# Patient Record
Sex: FEMALE | Race: WHITE | NOT HISPANIC OR LATINO | URBAN - METROPOLITAN AREA
[De-identification: names, ages, dates, MRNs, and addresses within clinical notes are randomized per-mention and may not be internally consistent; named-entity substitution may affect disease eponyms.]

---

## 2017-03-27 ENCOUNTER — OUTPATIENT (OUTPATIENT)
Dept: OUTPATIENT SERVICES | Facility: HOSPITAL | Age: 57
LOS: 1 days | End: 2017-03-27
Payer: COMMERCIAL

## 2017-03-27 ENCOUNTER — APPOINTMENT (OUTPATIENT)
Dept: ULTRASOUND IMAGING | Facility: CLINIC | Age: 57
End: 2017-03-27

## 2017-03-27 DIAGNOSIS — Z96.651 PRESENCE OF RIGHT ARTIFICIAL KNEE JOINT: Chronic | ICD-10-CM

## 2017-03-27 DIAGNOSIS — Z96.60 PRESENCE OF UNSPECIFIED ORTHOPEDIC JOINT IMPLANT: Chronic | ICD-10-CM

## 2017-03-27 DIAGNOSIS — Z98.89 OTHER SPECIFIED POSTPROCEDURAL STATES: Chronic | ICD-10-CM

## 2017-03-27 DIAGNOSIS — Z00.8 ENCOUNTER FOR OTHER GENERAL EXAMINATION: ICD-10-CM

## 2017-03-27 PROCEDURE — 76536 US EXAM OF HEAD AND NECK: CPT

## 2019-10-22 ENCOUNTER — OUTPATIENT (OUTPATIENT)
Dept: OUTPATIENT SERVICES | Facility: HOSPITAL | Age: 59
LOS: 1 days | End: 2019-10-22
Payer: COMMERCIAL

## 2019-10-22 VITALS
SYSTOLIC BLOOD PRESSURE: 152 MMHG | OXYGEN SATURATION: 99 % | HEART RATE: 72 BPM | HEIGHT: 62 IN | DIASTOLIC BLOOD PRESSURE: 75 MMHG | RESPIRATION RATE: 16 BRPM | WEIGHT: 156.97 LBS | TEMPERATURE: 99 F

## 2019-10-22 DIAGNOSIS — Z01.818 ENCOUNTER FOR OTHER PREPROCEDURAL EXAMINATION: ICD-10-CM

## 2019-10-22 DIAGNOSIS — Z96.60 PRESENCE OF UNSPECIFIED ORTHOPEDIC JOINT IMPLANT: Chronic | ICD-10-CM

## 2019-10-22 DIAGNOSIS — M16.11 UNILATERAL PRIMARY OSTEOARTHRITIS, RIGHT HIP: ICD-10-CM

## 2019-10-22 DIAGNOSIS — Z98.89 OTHER SPECIFIED POSTPROCEDURAL STATES: Chronic | ICD-10-CM

## 2019-10-22 DIAGNOSIS — Z96.651 PRESENCE OF RIGHT ARTIFICIAL KNEE JOINT: Chronic | ICD-10-CM

## 2019-10-22 LAB
ALBUMIN SERPL ELPH-MCNC: 3.6 G/DL — SIGNIFICANT CHANGE UP (ref 3.3–5)
ALP SERPL-CCNC: 116 U/L — SIGNIFICANT CHANGE UP (ref 40–120)
ALT FLD-CCNC: 32 U/L — SIGNIFICANT CHANGE UP (ref 12–78)
ANION GAP SERPL CALC-SCNC: 5 MMOL/L — SIGNIFICANT CHANGE UP (ref 5–17)
APPEARANCE UR: ABNORMAL
APTT BLD: 30.2 SEC — SIGNIFICANT CHANGE UP (ref 28.5–37)
AST SERPL-CCNC: 29 U/L — SIGNIFICANT CHANGE UP (ref 15–37)
BILIRUB SERPL-MCNC: 0.6 MG/DL — SIGNIFICANT CHANGE UP (ref 0.2–1.2)
BILIRUB UR-MCNC: NEGATIVE — SIGNIFICANT CHANGE UP
BUN SERPL-MCNC: 15 MG/DL — SIGNIFICANT CHANGE UP (ref 7–23)
CALCIUM SERPL-MCNC: 9.1 MG/DL — SIGNIFICANT CHANGE UP (ref 8.5–10.1)
CHLORIDE SERPL-SCNC: 106 MMOL/L — SIGNIFICANT CHANGE UP (ref 96–108)
CO2 SERPL-SCNC: 29 MMOL/L — SIGNIFICANT CHANGE UP (ref 22–31)
COLOR SPEC: YELLOW — SIGNIFICANT CHANGE UP
CREAT SERPL-MCNC: 0.51 MG/DL — SIGNIFICANT CHANGE UP (ref 0.5–1.3)
DIFF PNL FLD: ABNORMAL
GLUCOSE SERPL-MCNC: 90 MG/DL — SIGNIFICANT CHANGE UP (ref 70–99)
GLUCOSE UR QL: NEGATIVE — SIGNIFICANT CHANGE UP
HBA1C BLD-MCNC: 5.4 % — SIGNIFICANT CHANGE UP (ref 4–5.6)
HCT VFR BLD CALC: 36.1 % — SIGNIFICANT CHANGE UP (ref 34.5–45)
HGB BLD-MCNC: 11.6 G/DL — SIGNIFICANT CHANGE UP (ref 11.5–15.5)
INR BLD: 1 RATIO — SIGNIFICANT CHANGE UP (ref 0.88–1.16)
KETONES UR-MCNC: NEGATIVE — SIGNIFICANT CHANGE UP
LEUKOCYTE ESTERASE UR-ACNC: ABNORMAL
MCHC RBC-ENTMCNC: 27.8 PG — SIGNIFICANT CHANGE UP (ref 27–34)
MCHC RBC-ENTMCNC: 32.1 GM/DL — SIGNIFICANT CHANGE UP (ref 32–36)
MCV RBC AUTO: 86.4 FL — SIGNIFICANT CHANGE UP (ref 80–100)
NITRITE UR-MCNC: NEGATIVE — SIGNIFICANT CHANGE UP
NRBC # BLD: 0 /100 WBCS — SIGNIFICANT CHANGE UP (ref 0–0)
PH UR: 6.5 — SIGNIFICANT CHANGE UP (ref 5–8)
PLATELET # BLD AUTO: 307 K/UL — SIGNIFICANT CHANGE UP (ref 150–400)
POTASSIUM SERPL-MCNC: 5.1 MMOL/L — SIGNIFICANT CHANGE UP (ref 3.5–5.3)
POTASSIUM SERPL-SCNC: 5.1 MMOL/L — SIGNIFICANT CHANGE UP (ref 3.5–5.3)
PROT SERPL-MCNC: 9.5 G/DL — HIGH (ref 6–8.3)
PROT UR-MCNC: NEGATIVE — SIGNIFICANT CHANGE UP
PROTHROM AB SERPL-ACNC: 11.4 SEC — SIGNIFICANT CHANGE UP (ref 10–12.9)
RBC # BLD: 4.18 M/UL — SIGNIFICANT CHANGE UP (ref 3.8–5.2)
RBC # FLD: 14.5 % — SIGNIFICANT CHANGE UP (ref 10.3–14.5)
SODIUM SERPL-SCNC: 140 MMOL/L — SIGNIFICANT CHANGE UP (ref 135–145)
SP GR SPEC: 1 — LOW (ref 1.01–1.02)
UROBILINOGEN FLD QL: NEGATIVE — SIGNIFICANT CHANGE UP
WBC # BLD: 6.08 K/UL — SIGNIFICANT CHANGE UP (ref 3.8–10.5)
WBC # FLD AUTO: 6.08 K/UL — SIGNIFICANT CHANGE UP (ref 3.8–10.5)

## 2019-10-22 PROCEDURE — 71046 X-RAY EXAM CHEST 2 VIEWS: CPT | Mod: 26

## 2019-10-22 PROCEDURE — 93010 ELECTROCARDIOGRAM REPORT: CPT

## 2019-10-22 PROCEDURE — 73502 X-RAY EXAM HIP UNI 2-3 VIEWS: CPT | Mod: 26,RT

## 2019-10-22 NOTE — H&P PST ADULT - NEGATIVE ENMT SYMPTOMS
no sinus symptoms/no nasal congestion/no nasal discharge/no ear pain/no vertigo/no nasal obstruction

## 2019-10-22 NOTE — H&P PST ADULT - NSICDXPASTMEDICALHX_GEN_ALL_CORE_FT
PAST MEDICAL HISTORY:  Anemia     CIDP (chronic inflammatory demyelinating polyneuropathy)     Demyelinating neuropathy     H/O: rheumatic fever     Hyperlipidemia     Osteoarthritis, unspecified osteoarthritis type, unspecified site     Thyroid cyst     Unilateral primary osteoarthritis, right hip

## 2019-10-22 NOTE — H&P PST ADULT - HISTORY OF PRESENT ILLNESS
60 yo female scheduled for right hip replacement on 11/5/19 with Dr Beltran.  Patient states she had pain and decreased range of motion in eht right hip for a few years.  Pain is intermittent better when sitting.  Pain is 8/10 when standing and walking

## 2019-10-22 NOTE — H&P PST ADULT - NSICDXFAMILYHX_GEN_ALL_CORE_FT
FAMILY HISTORY:  Father  Still living? No  Diabetes mellitus, Age at diagnosis: Age Unknown    Mother  Still living? Yes, Estimated age: 81-90  CAD (coronary artery disease), Age at diagnosis: Age Unknown    Sibling  Still living? No  Family history of brain cancer, Age at diagnosis: 41-50

## 2019-10-22 NOTE — H&P PST ADULT - ASSESSMENT
60 yo female scheduled for right hip replacement on 11/5/19 with Dr Beltran.  Check labs cbc cmp pt ptt type and screen ua urine culture mrsa hgb a1c  ekg  xray chest and right hip pelvis  medical clearance  cardiology clearance  hibiclens for 3 days with written and verbal instructions given  patient is aware that if her nasal swab for mrsa is positive she will be prescribed mupirocin for 5 days of treatment  patient is aware that if her hgb a1c is 9 or greater she will need to see and endocrinologist before surgery  patient is declining to attend the joint replacement class  patient verbalizes understanding of instructions

## 2019-10-22 NOTE — H&P PST ADULT - NSICDXPASTSURGICALHX_GEN_ALL_CORE_FT
PAST SURGICAL HISTORY:  S/P carpal tunnel release left and right (1986)    S/P hip replacement left hip (5/15/14), Revision (6/5/14)    S/P knee surgery reconstruction of right patella (12/2010)    Status post total right knee replacement

## 2019-10-22 NOTE — H&P PST ADULT - MEDICATION HERBAL REMEDIES, PROFILE
Bedside and Verbal shift change report given to ARGENIS Landeros (oncoming nurse) by Saumya (offgoing nurse). Report included the following information SBAR, Kardex, Intake/Output, MAR, Recent Results and Cardiac Rhythm NSR /ST. no

## 2019-10-23 LAB
CULTURE RESULTS: SIGNIFICANT CHANGE UP
MRSA PCR RESULT.: SIGNIFICANT CHANGE UP
S AUREUS DNA NOSE QL NAA+PROBE: SIGNIFICANT CHANGE UP
SPECIMEN SOURCE: SIGNIFICANT CHANGE UP

## 2019-11-04 ENCOUNTER — TRANSCRIPTION ENCOUNTER (OUTPATIENT)
Age: 59
End: 2019-11-04

## 2019-11-05 ENCOUNTER — INPATIENT (INPATIENT)
Facility: HOSPITAL | Age: 59
LOS: 4 days | Discharge: HOME | DRG: 470 | End: 2019-11-10
Attending: ORTHOPAEDIC SURGERY | Admitting: ORTHOPAEDIC SURGERY
Payer: COMMERCIAL

## 2019-11-05 ENCOUNTER — TRANSCRIPTION ENCOUNTER (OUTPATIENT)
Age: 59
End: 2019-11-05

## 2019-11-05 ENCOUNTER — RESULT REVIEW (OUTPATIENT)
Age: 59
End: 2019-11-05

## 2019-11-05 VITALS
HEIGHT: 62 IN | DIASTOLIC BLOOD PRESSURE: 78 MMHG | WEIGHT: 158.95 LBS | TEMPERATURE: 99 F | OXYGEN SATURATION: 97 % | SYSTOLIC BLOOD PRESSURE: 146 MMHG | RESPIRATION RATE: 14 BRPM | HEART RATE: 73 BPM

## 2019-11-05 DIAGNOSIS — Z96.60 PRESENCE OF UNSPECIFIED ORTHOPEDIC JOINT IMPLANT: Chronic | ICD-10-CM

## 2019-11-05 DIAGNOSIS — M16.11 UNILATERAL PRIMARY OSTEOARTHRITIS, RIGHT HIP: ICD-10-CM

## 2019-11-05 DIAGNOSIS — Z96.651 PRESENCE OF RIGHT ARTIFICIAL KNEE JOINT: Chronic | ICD-10-CM

## 2019-11-05 DIAGNOSIS — Z98.89 OTHER SPECIFIED POSTPROCEDURAL STATES: Chronic | ICD-10-CM

## 2019-11-05 LAB
ANION GAP SERPL CALC-SCNC: 7 MMOL/L — SIGNIFICANT CHANGE UP (ref 5–17)
BUN SERPL-MCNC: 13 MG/DL — SIGNIFICANT CHANGE UP (ref 7–23)
CALCIUM SERPL-MCNC: 8.4 MG/DL — LOW (ref 8.5–10.1)
CHLORIDE SERPL-SCNC: 109 MMOL/L — HIGH (ref 96–108)
CO2 SERPL-SCNC: 26 MMOL/L — SIGNIFICANT CHANGE UP (ref 22–31)
CREAT SERPL-MCNC: 0.59 MG/DL — SIGNIFICANT CHANGE UP (ref 0.5–1.3)
GLUCOSE SERPL-MCNC: 134 MG/DL — HIGH (ref 70–99)
HCT VFR BLD CALC: 31.2 % — LOW (ref 34.5–45)
HGB BLD-MCNC: 10.1 G/DL — LOW (ref 11.5–15.5)
MCHC RBC-ENTMCNC: 28.3 PG — SIGNIFICANT CHANGE UP (ref 27–34)
MCHC RBC-ENTMCNC: 32.4 GM/DL — SIGNIFICANT CHANGE UP (ref 32–36)
MCV RBC AUTO: 87.4 FL — SIGNIFICANT CHANGE UP (ref 80–100)
NRBC # BLD: 0 /100 WBCS — SIGNIFICANT CHANGE UP (ref 0–0)
PLATELET # BLD AUTO: 374 K/UL — SIGNIFICANT CHANGE UP (ref 150–400)
POTASSIUM SERPL-MCNC: 4.2 MMOL/L — SIGNIFICANT CHANGE UP (ref 3.5–5.3)
POTASSIUM SERPL-SCNC: 4.2 MMOL/L — SIGNIFICANT CHANGE UP (ref 3.5–5.3)
RBC # BLD: 3.57 M/UL — LOW (ref 3.8–5.2)
RBC # FLD: 14.3 % — SIGNIFICANT CHANGE UP (ref 10.3–14.5)
SODIUM SERPL-SCNC: 142 MMOL/L — SIGNIFICANT CHANGE UP (ref 135–145)
WBC # BLD: 16.18 K/UL — HIGH (ref 3.8–10.5)
WBC # FLD AUTO: 16.18 K/UL — HIGH (ref 3.8–10.5)

## 2019-11-05 PROCEDURE — 88305 TISSUE EXAM BY PATHOLOGIST: CPT | Mod: 26

## 2019-11-05 PROCEDURE — 88311 DECALCIFY TISSUE: CPT | Mod: 26

## 2019-11-05 RX ORDER — ACETAMINOPHEN 500 MG
650 TABLET ORAL EVERY 6 HOURS
Refills: 0 | Status: DISCONTINUED | OUTPATIENT
Start: 2019-11-05 | End: 2019-11-06

## 2019-11-05 RX ORDER — SIMVASTATIN 20 MG/1
20 TABLET, FILM COATED ORAL AT BEDTIME
Refills: 0 | Status: DISCONTINUED | OUTPATIENT
Start: 2019-11-05 | End: 2019-11-10

## 2019-11-05 RX ORDER — OXYCODONE HYDROCHLORIDE 5 MG/1
10 TABLET ORAL ONCE
Refills: 0 | Status: DISCONTINUED | OUTPATIENT
Start: 2019-11-05 | End: 2019-11-05

## 2019-11-05 RX ORDER — OXYCODONE HYDROCHLORIDE 5 MG/1
10 TABLET ORAL EVERY 4 HOURS
Refills: 0 | Status: DISCONTINUED | OUTPATIENT
Start: 2019-11-05 | End: 2019-11-05

## 2019-11-05 RX ORDER — HYDROMORPHONE HYDROCHLORIDE 2 MG/ML
0.5 INJECTION INTRAMUSCULAR; INTRAVENOUS; SUBCUTANEOUS EVERY 4 HOURS
Refills: 0 | Status: DISCONTINUED | OUTPATIENT
Start: 2019-11-05 | End: 2019-11-06

## 2019-11-05 RX ORDER — OXYCODONE HYDROCHLORIDE 5 MG/1
5 TABLET ORAL EVERY 4 HOURS
Refills: 0 | Status: DISCONTINUED | OUTPATIENT
Start: 2019-11-05 | End: 2019-11-05

## 2019-11-05 RX ORDER — POLYETHYLENE GLYCOL 3350 17 G/17G
17 POWDER, FOR SOLUTION ORAL DAILY
Refills: 0 | Status: DISCONTINUED | OUTPATIENT
Start: 2019-11-05 | End: 2019-11-10

## 2019-11-05 RX ORDER — PANTOPRAZOLE SODIUM 20 MG/1
40 TABLET, DELAYED RELEASE ORAL
Refills: 0 | Status: DISCONTINUED | OUTPATIENT
Start: 2019-11-05 | End: 2019-11-10

## 2019-11-05 RX ORDER — CEFAZOLIN SODIUM 1 G
2000 VIAL (EA) INJECTION EVERY 8 HOURS
Refills: 0 | Status: COMPLETED | OUTPATIENT
Start: 2019-11-05 | End: 2019-11-06

## 2019-11-05 RX ORDER — METOPROLOL TARTRATE 50 MG
25 TABLET ORAL DAILY
Refills: 0 | Status: DISCONTINUED | OUTPATIENT
Start: 2019-11-05 | End: 2019-11-06

## 2019-11-05 RX ORDER — METOPROLOL TARTRATE 50 MG
1 TABLET ORAL
Qty: 0 | Refills: 0 | DISCHARGE

## 2019-11-05 RX ORDER — METOCLOPRAMIDE HCL 10 MG
5 TABLET ORAL ONCE
Refills: 0 | Status: DISCONTINUED | OUTPATIENT
Start: 2019-11-05 | End: 2019-11-05

## 2019-11-05 RX ORDER — ONDANSETRON 8 MG/1
4 TABLET, FILM COATED ORAL EVERY 6 HOURS
Refills: 0 | Status: DISCONTINUED | OUTPATIENT
Start: 2019-11-05 | End: 2019-11-08

## 2019-11-05 RX ORDER — ASPIRIN/CALCIUM CARB/MAGNESIUM 324 MG
325 TABLET ORAL
Refills: 0 | Status: DISCONTINUED | OUTPATIENT
Start: 2019-11-05 | End: 2019-11-10

## 2019-11-05 RX ORDER — SENNA PLUS 8.6 MG/1
2 TABLET ORAL AT BEDTIME
Refills: 0 | Status: DISCONTINUED | OUTPATIENT
Start: 2019-11-05 | End: 2019-11-10

## 2019-11-05 RX ORDER — BUPIVACAINE 13.3 MG/ML
20 INJECTION, SUSPENSION, LIPOSOMAL INFILTRATION ONCE
Refills: 0 | Status: DISCONTINUED | OUTPATIENT
Start: 2019-11-05 | End: 2019-11-05

## 2019-11-05 RX ORDER — CEFAZOLIN SODIUM 1 G
2000 VIAL (EA) INJECTION ONCE
Refills: 0 | Status: COMPLETED | OUTPATIENT
Start: 2019-11-05 | End: 2019-11-05

## 2019-11-05 RX ORDER — SODIUM CHLORIDE 9 MG/ML
1000 INJECTION, SOLUTION INTRAVENOUS
Refills: 0 | Status: DISCONTINUED | OUTPATIENT
Start: 2019-11-05 | End: 2019-11-05

## 2019-11-05 RX ORDER — OXYCODONE HYDROCHLORIDE 5 MG/1
5 TABLET ORAL ONCE
Refills: 0 | Status: DISCONTINUED | OUTPATIENT
Start: 2019-11-05 | End: 2019-11-05

## 2019-11-05 RX ORDER — TRAMADOL HYDROCHLORIDE 50 MG/1
50 TABLET ORAL EVERY 6 HOURS
Refills: 0 | Status: DISCONTINUED | OUTPATIENT
Start: 2019-11-05 | End: 2019-11-10

## 2019-11-05 RX ORDER — TRAMADOL HYDROCHLORIDE 50 MG/1
25 TABLET ORAL EVERY 6 HOURS
Refills: 0 | Status: DISCONTINUED | OUTPATIENT
Start: 2019-11-05 | End: 2019-11-10

## 2019-11-05 RX ORDER — FERROUS SULFATE 325(65) MG
325 TABLET ORAL DAILY
Refills: 0 | Status: DISCONTINUED | OUTPATIENT
Start: 2019-11-05 | End: 2019-11-10

## 2019-11-05 RX ORDER — HYDROMORPHONE HYDROCHLORIDE 2 MG/ML
0.5 INJECTION INTRAMUSCULAR; INTRAVENOUS; SUBCUTANEOUS
Refills: 0 | Status: DISCONTINUED | OUTPATIENT
Start: 2019-11-05 | End: 2019-11-05

## 2019-11-05 RX ADMIN — Medication 100 MILLIGRAM(S): at 17:17

## 2019-11-05 RX ADMIN — SODIUM CHLORIDE 75 MILLILITER(S): 9 INJECTION, SOLUTION INTRAVENOUS at 07:06

## 2019-11-05 RX ADMIN — SODIUM CHLORIDE 100 MILLILITER(S): 9 INJECTION, SOLUTION INTRAVENOUS at 11:26

## 2019-11-05 RX ADMIN — SIMVASTATIN 20 MILLIGRAM(S): 20 TABLET, FILM COATED ORAL at 21:29

## 2019-11-05 RX ADMIN — Medication 325 MILLIGRAM(S): at 17:17

## 2019-11-05 NOTE — DISCHARGE NOTE PROVIDER - CARE PROVIDER_API CALL
Sajan eBltran)  Orthopaedic Surgery  [address] [place] [address]  Phone: [phone]  Fax: [phone]  Follow Up Time: Sajan Beltran)  Orthopaedic Surgery  [address] [place] [address]  Phone: [phone]  Fax: [phone]  Follow Up Time: 2 weeks

## 2019-11-05 NOTE — DISCHARGE NOTE PROVIDER - NSDCCPTREATMENT_GEN_ALL_CORE_FT
PRINCIPAL PROCEDURE  Procedure: Total replacement of hip joint by anterior approach  Findings and Treatment: right

## 2019-11-05 NOTE — DISCHARGE NOTE PROVIDER - NSDCCPCAREPLAN_GEN_ALL_CORE_FT
PRINCIPAL DISCHARGE DIAGNOSIS  Diagnosis: Osteoarthritis of right hip  Assessment and Plan of Treatment: PRINCIPAL DISCHARGE DIAGNOSIS  Diagnosis: Osteoarthritis of right hip  Assessment and Plan of Treatment: You underwent total hip repalacement.  Please take your aspirin 325 mg twice a day. Do not skip doses. This is for DVT prophylaxis  Take Tramadol 50 mg only as neededfor pain.  Please follow up with Dr. Beltran in one to two weeks following discharge        SECONDARY DISCHARGE DIAGNOSES  Diagnosis: Allergy to adhesive  Assessment and Plan of Treatment: You were noted to have an allergy to adhesives.   You were given hydrocortisone cream. Please apply twice a day to the affected area for one week.  If this does not resolve you should follow up with your primary care physician

## 2019-11-05 NOTE — DISCHARGE NOTE PROVIDER - NSDCACTIVITY_GEN_ALL_CORE
Walking - Outdoors allowed/Stairs allowed/Walking - Indoors allowed Walking - Outdoors allowed/Stairs allowed/Walking - Indoors allowed/No restrictions

## 2019-11-05 NOTE — DISCHARGE NOTE PROVIDER - HOSPITAL COURSE
The patient is a 59 year old female status post elective Anterior Total Hip Arthroplasty to the right hip after failing outpatient non-operative conservative management.  Patient presented to [facility] after being medically cleared for an elective surgical procedure. The patient was taken to the operating room on date mentioned above. Prophylactic antibiotics were started before the procedure and continued for 24 hours.  There were no complications during the procedure and patient tolerated the procedure well.  The patient was transferred to recovery room in stable condition and subsequently to surgical floor.  Patient was placed ASA 325mg for anticoagulation.  All home medications were continued.  The patient received physical therapy daily and daily labs were followed.  The dressing was kept clean, dry, intact.  The rest of the hospital stay was unremarkable. The patient is a 59 year old female status post elective Anterior Total Hip Arthroplasty to the right hip after failing outpatient non-operative conservative management.  Patient presented to [facility] after being medically cleared for an elective surgical procedure. The patient was taken to the operating room on date mentioned above. Prophylactic antibiotics were started before the procedure and continued for 24 hours.  There were no complications during the procedure and patient tolerated the procedure well.  The patient was transferred to recovery room in stable condition and subsequently to surgical floor.  Patient was placed ASA 325mg for anticoagulation.  All home medications were continued.  The patient received physical therapy daily and daily labs were followed.  The dressing was kept clean, dry, intact.          post op fever - likely UTI    post op anemia - given PRBC transfusion The patient is a 59 year old female status post elective Anterior Total Hip Arthroplasty to the right hip after failing outpatient non-operative conservative management.  Patient presented to [facility] after being medically cleared for an elective surgical procedure. The patient was taken to the operating room on date mentioned above. Prophylactic antibiotics were started before the procedure and continued for 24 hours.  There were no complications during the procedure and patient tolerated the procedure well.  The patient was transferred to recovery room in stable condition and subsequently to surgical floor.  Patient was placed ASA 325mg for anticoagulation.  All home medications were continued.  The patient received physical therapy daily and daily labs were followed.  The patient developed a post op fever and empiric antibiotics were started for presumed cellulitis. Fevers resolved and a reaction to the adhesive dressing over the bandage site was noted. The patient also required a transfusion of one unit of packed red blood cells for postoperative anemia. Antibiotics were stopped and the patient was deemed medically stable on the day of discharge.

## 2019-11-05 NOTE — DISCHARGE NOTE PROVIDER - NSDCFUADDINST_GEN_ALL_CORE_FT
Discharge Instructions Total Hip Arthroplasty    1. Diet: Resume previous diet  2. Activity: WBAT. Rolling walker. Daily Physical Therapy. Anterior hip precautions.  3. Call with: fever over 101, wound redness, drainage or open area, calf pain/calf swelling.  4. Wound Care: Remove old and Place new Aquacel bandage to hip wound every 7days.   5. RN can Remove Sutures Post Op Day #14 () so long as wound is healed, no drainage or open area.   6. OK to Shower with Aquacel. Must be an Aquacel. Avoid direct water beating on bandage.   7. DVT PE Prophylaxis: Lovenox 40mg. See Med Rec.  8.  Follow Up: Dr. Beltran 2 weeks in office. Call to schedule.   9. Pain Medication: eRX sent to your pharmacy for  if you go home.

## 2019-11-05 NOTE — DISCHARGE NOTE PROVIDER - NSDCMRMEDTOKEN_GEN_ALL_CORE_FT
aspirin 81 mg oral tablet: 1 tab(s) orally once a day  ferrous sulfate 325 mg oral delayed release tablet: 1 tab(s) orally once a day  Gamunex:   once a month  last 10/16 /19  metoprolol succinate 25 mg oral capsule, extended release: 1 cap(s) orally once a day  Multiple Vitamins oral tablet: 1 tab(s) orally once a day  simvastatin 20 mg oral tablet: 1 tab(s) orally once a day (at bedtime) acetaminophen 325 mg oral tablet: 2 tab(s) orally every 4 hours, As needed, Temp greater or equal to 38C (100.4F)  aluminum hydroxide-magnesium hydroxide 200 mg-200 mg/5 mL oral suspension: 30 milliliter(s) orally 4 times a day, As needed, Indigestion  aspirin 325 mg oral delayed release tablet: 1 tab(s) orally 2 times a day  ferrous sulfate 325 mg oral delayed release tablet: 1 tab(s) orally once a day  Gamunex:   once a month  last 10/16 /19  metoprolol succinate 25 mg oral capsule, extended release: 1 cap(s) orally once a day  Multiple Vitamins oral tablet: 1 tab(s) orally once a day  senna oral tablet: 2 tab(s) orally once a day (at bedtime), As needed, Constipation  simvastatin 20 mg oral tablet: 1 tab(s) orally once a day (at bedtime) acetaminophen 325 mg oral tablet: 2 tab(s) orally every 4 hours, As needed, Temp greater or equal to 38C (100.4F)  aluminum hydroxide-magnesium hydroxide 200 mg-200 mg/5 mL oral suspension: 30 milliliter(s) orally 4 times a day, As needed, Indigestion  aspirin 325 mg oral delayed release tablet: 1 tab(s) orally 2 times a day  clotrimazole-betamethasone dipropionate 1%-0.05% topical cream: Apply topically to affected area. twice a day for 7 days.  ferrous sulfate 325 mg oral delayed release tablet: 1 tab(s) orally once a day  Gamunex:   once a month  last 10/16 /19  metoprolol succinate 25 mg oral capsule, extended release: 1 cap(s) orally once a day  Multiple Vitamins oral tablet: 1 tab(s) orally once a day  senna oral tablet: 2 tab(s) orally once a day (at bedtime), As needed, Constipation  simvastatin 20 mg oral tablet: 1 tab(s) orally once a day (at bedtime)  traMADol 50 mg oral tablet: 1 tab(s) orally every 6 hours, As Needed -for severe pain MDD:4

## 2019-11-05 NOTE — CHART NOTE - NSCHARTNOTEFT_GEN_A_CORE
Postop Check    Patient tolerated the procedure well. Patient seen and examined at bedside.  No acute complaints at this time. Pain well controlled. Denies chest pain, shortness of breath, nausea or vomiting.     PE:  Vital Signs Last 24 Hrs  T(C): 36.6 (11-05-19 @ 13:10), Max: 37.1 (11-05-19 @ 07:06)  T(F): 97.8 (11-05-19 @ 13:10), Max: 98.8 (11-05-19 @ 07:06)  HR: 77 (11-05-19 @ 13:10) (70 - 102)  BP: 138/71 (11-05-19 @ 13:10) (137/67 - 148/75)  BP(mean): --  RR: 18 (11-05-19 @ 13:10) (10 - 18)  SpO2: 97% (11-05-19 @ 13:10) (96% - 100%)    General: NAD, resting comfortably in bed  RLE:   Dressing with one mild area of spotting 1x1 cm on the proximal part of the dressing, dry and intact throughout.  SCDs present bilaterally  Compartments soft and compressible  No calf tenderness bilaterally  +TA/EHL/FHL/GSC  SILT L3-S1  2+ DP/PT                          10.1   16.18 )-----------( 374      ( 05 Nov 2019 11:48 )             31.2     05 Nov 2019 11:48    142    |  109    |  13     ----------------------------<  134    4.2     |  26     |  0.59     Ca    8.4        05 Nov 2019 11:48          A/P:  59y f s/p R YENI POD 0  -PT/OT -WBAT on RLE  -Pain Control  -DVT ppx w/aspirin  -Continue perioperative abx x 24 hours  -FU AM Labs  -Rest, ice, compress and elevate the extremity as we needed  -Incentive Spirometry  -Medical management appreciated    Ortho p. [phone]

## 2019-11-06 ENCOUNTER — TRANSCRIPTION ENCOUNTER (OUTPATIENT)
Age: 59
End: 2019-11-06

## 2019-11-06 DIAGNOSIS — E78.5 HYPERLIPIDEMIA, UNSPECIFIED: ICD-10-CM

## 2019-11-06 DIAGNOSIS — I95.9 HYPOTENSION, UNSPECIFIED: ICD-10-CM

## 2019-11-06 DIAGNOSIS — Z29.9 ENCOUNTER FOR PROPHYLACTIC MEASURES, UNSPECIFIED: ICD-10-CM

## 2019-11-06 DIAGNOSIS — M16.11 UNILATERAL PRIMARY OSTEOARTHRITIS, RIGHT HIP: ICD-10-CM

## 2019-11-06 LAB
ANION GAP SERPL CALC-SCNC: 7 MMOL/L — SIGNIFICANT CHANGE UP (ref 5–17)
BUN SERPL-MCNC: 17 MG/DL — SIGNIFICANT CHANGE UP (ref 7–23)
CALCIUM SERPL-MCNC: 8.4 MG/DL — LOW (ref 8.5–10.1)
CHLORIDE SERPL-SCNC: 105 MMOL/L — SIGNIFICANT CHANGE UP (ref 96–108)
CO2 SERPL-SCNC: 26 MMOL/L — SIGNIFICANT CHANGE UP (ref 22–31)
CREAT SERPL-MCNC: 0.65 MG/DL — SIGNIFICANT CHANGE UP (ref 0.5–1.3)
GLUCOSE SERPL-MCNC: 108 MG/DL — HIGH (ref 70–99)
HCT VFR BLD CALC: 28 % — LOW (ref 34.5–45)
HCT VFR BLD CALC: 28.5 % — LOW (ref 34.5–45)
HGB BLD-MCNC: 8.9 G/DL — LOW (ref 11.5–15.5)
HGB BLD-MCNC: 9 G/DL — LOW (ref 11.5–15.5)
MCHC RBC-ENTMCNC: 27.6 PG — SIGNIFICANT CHANGE UP (ref 27–34)
MCHC RBC-ENTMCNC: 31.6 GM/DL — LOW (ref 32–36)
MCV RBC AUTO: 87.4 FL — SIGNIFICANT CHANGE UP (ref 80–100)
NRBC # BLD: 0 /100 WBCS — SIGNIFICANT CHANGE UP (ref 0–0)
PLATELET # BLD AUTO: 411 K/UL — HIGH (ref 150–400)
POTASSIUM SERPL-MCNC: 4.2 MMOL/L — SIGNIFICANT CHANGE UP (ref 3.5–5.3)
POTASSIUM SERPL-SCNC: 4.2 MMOL/L — SIGNIFICANT CHANGE UP (ref 3.5–5.3)
RBC # BLD: 3.26 M/UL — LOW (ref 3.8–5.2)
RBC # FLD: 14.5 % — SIGNIFICANT CHANGE UP (ref 10.3–14.5)
SODIUM SERPL-SCNC: 138 MMOL/L — SIGNIFICANT CHANGE UP (ref 135–145)
WBC # BLD: 9.08 K/UL — SIGNIFICANT CHANGE UP (ref 3.8–10.5)
WBC # FLD AUTO: 9.08 K/UL — SIGNIFICANT CHANGE UP (ref 3.8–10.5)

## 2019-11-06 RX ORDER — SODIUM CHLORIDE 9 MG/ML
1000 INJECTION INTRAMUSCULAR; INTRAVENOUS; SUBCUTANEOUS ONCE
Refills: 0 | Status: COMPLETED | OUTPATIENT
Start: 2019-11-06 | End: 2019-11-06

## 2019-11-06 RX ORDER — SODIUM CHLORIDE 9 MG/ML
500 INJECTION INTRAMUSCULAR; INTRAVENOUS; SUBCUTANEOUS ONCE
Refills: 0 | Status: COMPLETED | OUTPATIENT
Start: 2019-11-06 | End: 2019-11-06

## 2019-11-06 RX ORDER — SODIUM CHLORIDE 9 MG/ML
1000 INJECTION INTRAMUSCULAR; INTRAVENOUS; SUBCUTANEOUS
Refills: 0 | Status: DISCONTINUED | OUTPATIENT
Start: 2019-11-06 | End: 2019-11-08

## 2019-11-06 RX ORDER — ACETAMINOPHEN 500 MG
650 TABLET ORAL EVERY 4 HOURS
Refills: 0 | Status: DISCONTINUED | OUTPATIENT
Start: 2019-11-06 | End: 2019-11-10

## 2019-11-06 RX ORDER — METOPROLOL TARTRATE 50 MG
25 TABLET ORAL DAILY
Refills: 0 | Status: DISCONTINUED | OUTPATIENT
Start: 2019-11-06 | End: 2019-11-06

## 2019-11-06 RX ADMIN — Medication 325 MILLIGRAM(S): at 05:31

## 2019-11-06 RX ADMIN — Medication 25 MILLIGRAM(S): at 05:31

## 2019-11-06 RX ADMIN — Medication 1 TABLET(S): at 12:45

## 2019-11-06 RX ADMIN — TRAMADOL HYDROCHLORIDE 50 MILLIGRAM(S): 50 TABLET ORAL at 00:07

## 2019-11-06 RX ADMIN — Medication 650 MILLIGRAM(S): at 16:14

## 2019-11-06 RX ADMIN — Medication 650 MILLIGRAM(S): at 21:15

## 2019-11-06 RX ADMIN — PANTOPRAZOLE SODIUM 40 MILLIGRAM(S): 20 TABLET, DELAYED RELEASE ORAL at 05:31

## 2019-11-06 RX ADMIN — SODIUM CHLORIDE 75 MILLILITER(S): 9 INJECTION INTRAMUSCULAR; INTRAVENOUS; SUBCUTANEOUS at 21:15

## 2019-11-06 RX ADMIN — TRAMADOL HYDROCHLORIDE 50 MILLIGRAM(S): 50 TABLET ORAL at 00:20

## 2019-11-06 RX ADMIN — POLYETHYLENE GLYCOL 3350 17 GRAM(S): 17 POWDER, FOR SOLUTION ORAL at 12:45

## 2019-11-06 RX ADMIN — Medication 650 MILLIGRAM(S): at 22:15

## 2019-11-06 RX ADMIN — Medication 325 MILLIGRAM(S): at 17:35

## 2019-11-06 RX ADMIN — Medication 650 MILLIGRAM(S): at 16:10

## 2019-11-06 RX ADMIN — SODIUM CHLORIDE 500 MILLILITER(S): 9 INJECTION INTRAMUSCULAR; INTRAVENOUS; SUBCUTANEOUS at 08:57

## 2019-11-06 RX ADMIN — Medication 325 MILLIGRAM(S): at 12:45

## 2019-11-06 RX ADMIN — SODIUM CHLORIDE 1000 MILLILITER(S): 9 INJECTION INTRAMUSCULAR; INTRAVENOUS; SUBCUTANEOUS at 11:15

## 2019-11-06 RX ADMIN — SIMVASTATIN 20 MILLIGRAM(S): 20 TABLET, FILM COATED ORAL at 21:16

## 2019-11-06 RX ADMIN — Medication 100 MILLIGRAM(S): at 00:02

## 2019-11-06 NOTE — OCCUPATIONAL THERAPY INITIAL EVALUATION ADULT - PERTINENT HX OF CURRENT PROBLEM, REHAB EVAL
58 y/o female s/p Right THR anterior approach on 11/5/19 by Dr. Beltran. Post-op hypotension; patient received +fluid bolus 11/6/19.

## 2019-11-06 NOTE — CHART NOTE - NSCHARTNOTEFT_GEN_A_CORE
Called by RN for persistent fevers to 102.6F (rectal), as well as concern regarding redness around incision site.  Patient given 650mg tylenol at 21:00 with repeat rectal temperature of 101.5F at 22:12.  Last dose of post-op ancef given at midnight today. Patient seen and examined at bedside immediately.  Patient reports some pain of right thigh, feels "feverish."    Vital Signs Last 24 Hrs  T(C): 38.6 (06 Nov 2019 22:12), Max: 39.2 (06 Nov 2019 16:05)  T(F): 101.5 (06 Nov 2019 22:12), Max: 102.6 (06 Nov 2019 16:05)  HR: 96 (06 Nov 2019 19:35) (86 - 107)  BP: 95/52 (06 Nov 2019 19:37) (82/52 - 114/65)  RR: 18 (06 Nov 2019 19:35) (16 - 18)  SpO2: 95% (06 Nov 2019 19:35) (95% - 98%)    PHYSICAL EXAM  GENERAL:  Well-nourished, well-developed female lying comfortably in bed in NAD  EXTREMITIES:  Right thigh +edematous compared with left.  Aquacel dressing in place with slight bloody drainage noted inside, no gross leakage.  Skin surrounding incision warm to the touch, mildly erythematous.  NEURO:  A&O x 3    A&P  59 year old female POD#1 s/p right total hip replacement, now with post-operative fevers to 102.6F, likely post-operative inflammatory process.  Suspicion for infection low POD#1.    - No need for further antibiotics at this time  - Manage fever with tylenol 650mg q4h PRN  - Will continue to monitor  - To be followed up by ortho team in the AM  - Discussed with Dr. Beltran

## 2019-11-06 NOTE — PROGRESS NOTE ADULT - SUBJECTIVE AND OBJECTIVE BOX
DEPARTMENT OF ANESTHESIA  POST ANESTHETIC EVALUATION    The Patient was interviewed and evaluated    Vital Signs Last 24 Hrs  T(C): 37.4 (06 Nov 2019 07:16), Max: 37.9 (06 Nov 2019 00:59)  T(F): 99.3 (06 Nov 2019 07:16), Max: 100.2 (06 Nov 2019 00:59)  HR: 93 (06 Nov 2019 07:16) (70 - 102)  BP: 86/54 (06 Nov 2019 07:16) (86/54 - 148/75)  BP(mean): --  RR: 17 (06 Nov 2019 07:16) (10 - 18)  SpO2: 96% (06 Nov 2019 07:16) (95% - 100%)    Evaluation:      (x ) No apparent complications or complaints regarding anesthesia care at this time  (x ) All questions were answered    Condition:  (x ) Stable      ( ) Guarded      ( ) Critical    Recommendations:  (x ) None     ( ) Other:

## 2019-11-06 NOTE — OCCUPATIONAL THERAPY INITIAL EVALUATION ADULT - ADDITIONAL COMMENTS
Pt reports that she will be staying with her mother and sister in a house with 8 steps with handrail to enter. 6 steps with handrail to access main floor of house with +stall shower. Pt owns a rolling walker, cane, raised toilet seat with arms, shower chair with back, dressing stick and LH shoe horn. Pt wears progressive lenses and bilateral hearing aides.

## 2019-11-06 NOTE — DISCHARGE NOTE NURSING/CASE MANAGEMENT/SOCIAL WORK - NSDCPNINST_GEN_ALL_CORE
Any fever; worsening pain or swelling of the leg and incision call your doctor or go to emergency room

## 2019-11-06 NOTE — CHART NOTE - NSCHARTNOTEFT_GEN_A_CORE
Called by RN for Pt with BP 95/52    Pt seen and examined at bedside, pt is asymptomatic at this time. Denies dizziness, lightheadedness, CP, SOB, N, palpitations.        T(C): 37.5 (11-06-19 @ 19:35), Max: 39.2 (11-06-19 @ 16:05)  HR: 96 (11-06-19 @ 19:35) (84 - 107)  BP: 95/52 (11-06-19 @ 19:37) (82/52 - 117/72)  RR: 18 (11-06-19 @ 19:35) (16 - 18)  SpO2: 95% (11-06-19 @ 19:35) (95% - 98%)  Wt(kg): --    Physical :  Gen- NAD, ncat  Cardio - s+1,s+2, rrr, no murmur  Lung - cta b/l, no wheeze, no rhonchi, no rales   Abdomen- +BS, NT/ND, no guarding, no rebound, no masses  Ext- no edema, 2+ pulses b/l  Neuro- CN grossly intact, strength 5/5 b/l extrem    LABS:                        8.9    x     )-----------( x        ( 06 Nov 2019 09:05 )             28.0     11-06    138  |  105  |  17  ----------------------------<  108<H>  4.2   |  26  |  0.65    Ca    8.4<L>      06 Nov 2019 06:44                  Assessment/Plan  59yFemale s/p R YENI POD 2 now with low BP  - Per chart review pts BP has been running low  - pt was given IVF earlier today  - spoke to Ortho resident and will monitor pt BP for now as she is asymptomatic  - RN to call if any changes      Dr Dave  PGY3  [phone]

## 2019-11-06 NOTE — PROGRESS NOTE ADULT - ASSESSMENT
A/P:  59y f s/p R YENI POD 2  -PT/OT -WBAT on RLE  -Pain Control  -DVT ppx w/aspirin  -Continue perioperative abx x 24 hours  -FU AM Labs  -Rest, ice, compress and elevate the extremity as we needed  -Incentive Spirometry  -Medical management appreciated    Ortho p. [phone].

## 2019-11-06 NOTE — PROGRESS NOTE ADULT - SUBJECTIVE AND OBJECTIVE BOX
Patient is a 59y old  Female who presents with a chief complaint of hip replacement (05 Nov 2019 17:02)      INTERVAL /OVERNIGHT EVENTS: felt lightheaded    MEDICATIONS  (STANDING):  aspirin enteric coated 325 milliGRAM(s) Oral two times a day  ferrous    sulfate 325 milliGRAM(s) Oral daily  metoprolol succinate ER 25 milliGRAM(s) Oral daily  multivitamin 1 Tablet(s) Oral daily  pantoprazole    Tablet 40 milliGRAM(s) Oral before breakfast  polyethylene glycol 3350 17 Gram(s) Oral daily  simvastatin 20 milliGRAM(s) Oral at bedtime    MEDICATIONS  (PRN):  acetaminophen   Tablet .. 650 milliGRAM(s) Oral every 6 hours PRN Temp greater or equal to 38C (100.4F)  aluminum hydroxide/magnesium hydroxide/simethicone Suspension 30 milliLiter(s) Oral four times a day PRN Indigestion  HYDROmorphone  Injectable 0.5 milliGRAM(s) IV Push every 4 hours PRN Severe Pain (7 - 10)  ondansetron Injectable 4 milliGRAM(s) IV Push every 6 hours PRN Nausea and/or Vomiting  senna 2 Tablet(s) Oral at bedtime PRN Constipation  traMADol 25 milliGRAM(s) Oral every 6 hours PRN Moderate Pain (4 - 6)  traMADol 50 milliGRAM(s) Oral every 6 hours PRN Severe Pain (7 - 10)      Allergies    adhesives (Rash)  No Known Drug Allergies    Intolerances        REVIEW OF SYSTEMS:  CONSTITUTIONAL: No fever, weight loss, or fatigue  EYES: No eye pain, visual disturbances, or discharge  ENMT:  No difficulty hearing, tinnitus, vertigo; No sinus or throat pain  NECK: No pain or stiffness  RESPIRATORY: No cough, wheezing, chills or hemoptysis; No shortness of breath  CARDIOVASCULAR: No chest pain, palpitations, dizziness, or leg swelling  GASTROINTESTINAL: No abdominal or epigastric pain. No nausea, vomiting, or hematemesis; No diarrhea or constipation. No melena or hematochezia.  GENITOURINARY: No dysuria, frequency, hematuria, or incontinence  NEUROLOGICAL: No headaches, memory loss, loss of strength, numbness, or tremors  SKIN: No itching, burning, rashes, or lesions   LYMPH NODES: No enlarged glands  ENDOCRINE: No heat or cold intolerance; No hair loss; No polydipsia or polyuria  MUSCULOSKELETAL: No joint pain or swelling; No muscle, back, or extremity pain  PSYCHIATRIC: No depression, anxiety, mood swings, or difficulty sleeping  HEME/LYMPH: No easy bruising, or bleeding gums  ALLERGY AND IMMUNOLOGIC: No hives or eczema    Vital Signs Last 24 Hrs  T(C): 37.4 (06 Nov 2019 07:16), Max: 37.9 (06 Nov 2019 00:59)  T(F): 99.3 (06 Nov 2019 07:16), Max: 100.2 (06 Nov 2019 00:59)  HR: 86 (06 Nov 2019 12:20) (75 - 97)  BP: 105/63 (06 Nov 2019 12:20) (82/52 - 121/68)  BP(mean): --  RR: 17 (06 Nov 2019 07:16) (16 - 18)  SpO2: 98% (06 Nov 2019 12:20) (95% - 98%)    PHYSICAL EXAM:  GENERAL: NAD, well-groomed, well-developed  HEAD:  Atraumatic, Normocephalic  EYES: EOMI, PERRLA, conjunctiva and sclera clear  ENMT: No tonsillar erythema, exudates, or enlargement; Moist mucous membranes, Good dentition, No lesions  NECK: Supple, No JVD, Normal thyroid  NERVOUS SYSTEM:  Alert & Oriented X3, Good concentration; Motor Strength 5/5 B/L upper and lower extremities; DTRs 2+ intact and symmetric  CHEST/LUNG: Clear to auscultation bilaterally; No rales, rhonchi, wheezing, or rubs  HEART: Regular rate and rhythm; No murmurs, rubs, or gallops  ABDOMEN: Soft, Nontender, Nondistended; Bowel sounds present  EXTREMITIES:  2+ Peripheral Pulses, No clubbing, cyanosis, or edema  LYMPH: No lymphadenopathy noted  SKIN: No rashes or lesions    LABS:                        8.9    x     )-----------( x        ( 06 Nov 2019 09:05 )             28.0     06 Nov 2019 06:44    138    |  105    |  17     ----------------------------<  108    4.2     |  26     |  0.65     Ca    8.4        06 Nov 2019 06:44          CAPILLARY BLOOD GLUCOSE          RADIOLOGY & ADDITIONAL TESTS:    Notes Reviewed:  [x ] YES  [ ] NO    Care Discussed with Consultants/Other Providers [x ] YES  [ ] NO

## 2019-11-06 NOTE — PROGRESS NOTE ADULT - SUBJECTIVE AND OBJECTIVE BOX
Pt S/E at bedside, no acute events overnight, pain controlled, denies SOB/CP/N/V.     Vital Signs Last 24 Hrs  T(C): 37.3 (06 Nov 2019 04:42), Max: 37.9 (06 Nov 2019 00:59)  T(F): 99.1 (06 Nov 2019 04:42), Max: 100.2 (06 Nov 2019 00:59)  HR: 97 (06 Nov 2019 04:42) (70 - 102)  BP: 100/63 (06 Nov 2019 04:42) (100/63 - 148/75)  BP(mean): --  RR: 16 (06 Nov 2019 04:42) (10 - 18)  SpO2: 95% (06 Nov 2019 04:42) (95% - 100%)    Gen: NAD,    Right Lower Extremity:  Dressing clean dry intact  +EHL/FHL/TA/GS  SILT L3-S1  +DP/PT Pulses  Compartments soft  No calf TTP B/L

## 2019-11-07 ENCOUNTER — TRANSCRIPTION ENCOUNTER (OUTPATIENT)
Age: 59
End: 2019-11-07

## 2019-11-07 LAB
ANION GAP SERPL CALC-SCNC: 4 MMOL/L — LOW (ref 5–17)
BUN SERPL-MCNC: 15 MG/DL — SIGNIFICANT CHANGE UP (ref 7–23)
CALCIUM SERPL-MCNC: 8.4 MG/DL — LOW (ref 8.5–10.1)
CHLORIDE SERPL-SCNC: 111 MMOL/L — HIGH (ref 96–108)
CO2 SERPL-SCNC: 27 MMOL/L — SIGNIFICANT CHANGE UP (ref 22–31)
CREAT SERPL-MCNC: 0.55 MG/DL — SIGNIFICANT CHANGE UP (ref 0.5–1.3)
GLUCOSE SERPL-MCNC: 109 MG/DL — HIGH (ref 70–99)
HCT VFR BLD CALC: 25.4 % — LOW (ref 34.5–45)
HCT VFR BLD CALC: 28.1 % — LOW (ref 34.5–45)
HGB BLD-MCNC: 8.1 G/DL — LOW (ref 11.5–15.5)
HGB BLD-MCNC: 9 G/DL — LOW (ref 11.5–15.5)
MCHC RBC-ENTMCNC: 28.3 PG — SIGNIFICANT CHANGE UP (ref 27–34)
MCHC RBC-ENTMCNC: 28.4 PG — SIGNIFICANT CHANGE UP (ref 27–34)
MCHC RBC-ENTMCNC: 31.9 GM/DL — LOW (ref 32–36)
MCHC RBC-ENTMCNC: 32 GM/DL — SIGNIFICANT CHANGE UP (ref 32–36)
MCV RBC AUTO: 88.6 FL — SIGNIFICANT CHANGE UP (ref 80–100)
MCV RBC AUTO: 88.8 FL — SIGNIFICANT CHANGE UP (ref 80–100)
NRBC # BLD: 0 /100 WBCS — SIGNIFICANT CHANGE UP (ref 0–0)
NRBC # BLD: 0 /100 WBCS — SIGNIFICANT CHANGE UP (ref 0–0)
PLATELET # BLD AUTO: 314 K/UL — SIGNIFICANT CHANGE UP (ref 150–400)
PLATELET # BLD AUTO: 324 K/UL — SIGNIFICANT CHANGE UP (ref 150–400)
POTASSIUM SERPL-MCNC: 4.7 MMOL/L — SIGNIFICANT CHANGE UP (ref 3.5–5.3)
POTASSIUM SERPL-SCNC: 4.7 MMOL/L — SIGNIFICANT CHANGE UP (ref 3.5–5.3)
RBC # BLD: 2.86 M/UL — LOW (ref 3.8–5.2)
RBC # BLD: 3.17 M/UL — LOW (ref 3.8–5.2)
RBC # FLD: 14.6 % — HIGH (ref 10.3–14.5)
RBC # FLD: 14.6 % — HIGH (ref 10.3–14.5)
SODIUM SERPL-SCNC: 142 MMOL/L — SIGNIFICANT CHANGE UP (ref 135–145)
WBC # BLD: 11.63 K/UL — HIGH (ref 3.8–10.5)
WBC # BLD: 11.75 K/UL — HIGH (ref 3.8–10.5)
WBC # FLD AUTO: 11.63 K/UL — HIGH (ref 3.8–10.5)
WBC # FLD AUTO: 11.75 K/UL — HIGH (ref 3.8–10.5)

## 2019-11-07 PROCEDURE — 86850 RBC ANTIBODY SCREEN: CPT

## 2019-11-07 PROCEDURE — 73502 X-RAY EXAM HIP UNI 2-3 VIEWS: CPT

## 2019-11-07 PROCEDURE — 80053 COMPREHEN METABOLIC PANEL: CPT

## 2019-11-07 PROCEDURE — 81001 URINALYSIS AUTO W/SCOPE: CPT

## 2019-11-07 PROCEDURE — 36415 COLL VENOUS BLD VENIPUNCTURE: CPT

## 2019-11-07 PROCEDURE — 85730 THROMBOPLASTIN TIME PARTIAL: CPT

## 2019-11-07 PROCEDURE — 87640 STAPH A DNA AMP PROBE: CPT

## 2019-11-07 PROCEDURE — 86923 COMPATIBILITY TEST ELECTRIC: CPT

## 2019-11-07 PROCEDURE — 87086 URINE CULTURE/COLONY COUNT: CPT

## 2019-11-07 PROCEDURE — 86900 BLOOD TYPING SEROLOGIC ABO: CPT

## 2019-11-07 PROCEDURE — 72170 X-RAY EXAM OF PELVIS: CPT

## 2019-11-07 PROCEDURE — G0463: CPT

## 2019-11-07 PROCEDURE — 86901 BLOOD TYPING SEROLOGIC RH(D): CPT

## 2019-11-07 PROCEDURE — 85027 COMPLETE CBC AUTOMATED: CPT

## 2019-11-07 PROCEDURE — 83036 HEMOGLOBIN GLYCOSYLATED A1C: CPT

## 2019-11-07 PROCEDURE — 93005 ELECTROCARDIOGRAM TRACING: CPT

## 2019-11-07 PROCEDURE — 71046 X-RAY EXAM CHEST 2 VIEWS: CPT

## 2019-11-07 PROCEDURE — 85610 PROTHROMBIN TIME: CPT

## 2019-11-07 RX ORDER — IBUPROFEN 200 MG
600 TABLET ORAL ONCE
Refills: 0 | Status: COMPLETED | OUTPATIENT
Start: 2019-11-07 | End: 2019-11-07

## 2019-11-07 RX ADMIN — Medication 600 MILLIGRAM(S): at 04:54

## 2019-11-07 RX ADMIN — Medication 325 MILLIGRAM(S): at 11:34

## 2019-11-07 RX ADMIN — PANTOPRAZOLE SODIUM 40 MILLIGRAM(S): 20 TABLET, DELAYED RELEASE ORAL at 04:54

## 2019-11-07 RX ADMIN — SENNA PLUS 2 TABLET(S): 8.6 TABLET ORAL at 11:36

## 2019-11-07 RX ADMIN — POLYETHYLENE GLYCOL 3350 17 GRAM(S): 17 POWDER, FOR SOLUTION ORAL at 11:34

## 2019-11-07 RX ADMIN — Medication 325 MILLIGRAM(S): at 17:24

## 2019-11-07 RX ADMIN — Medication 650 MILLIGRAM(S): at 02:04

## 2019-11-07 RX ADMIN — Medication 600 MILLIGRAM(S): at 05:58

## 2019-11-07 RX ADMIN — SIMVASTATIN 20 MILLIGRAM(S): 20 TABLET, FILM COATED ORAL at 21:25

## 2019-11-07 RX ADMIN — Medication 650 MILLIGRAM(S): at 01:34

## 2019-11-07 RX ADMIN — SODIUM CHLORIDE 75 MILLILITER(S): 9 INJECTION INTRAMUSCULAR; INTRAVENOUS; SUBCUTANEOUS at 13:27

## 2019-11-07 RX ADMIN — Medication 325 MILLIGRAM(S): at 04:54

## 2019-11-07 RX ADMIN — Medication 1 TABLET(S): at 11:34

## 2019-11-07 NOTE — PROGRESS NOTE ADULT - ASSESSMENT
Assessment:  59y Female s/p R anterior YENI POD #2    -Pain control  -DVT ppx: SCDs,   -WBAT/OOB with assistance as needed  -PT/OT  -Anterior hip precautions  -Ice as needed  -Encourage incentive spirometry  -DC planning: Home  -Will discuss with attending, Dr. Beltran, and will advise if plan changes.

## 2019-11-07 NOTE — PROGRESS NOTE ADULT - SUBJECTIVE AND OBJECTIVE BOX
Patient seen and examined at bedside, resting comfortably. No overnight events. Pain well controlled. Denies fevers, chills, chest pain, dyspnea, abdominal pain, n/v, numbness/tingling. No complaints at this time.     VITAL SIGNS  T(C): 38 (11-07-19 @ 04:14), Max: 39.2 (11-06-19 @ 16:05)  HR: 84 (11-07-19 @ 04:14) (84 - 107)  BP: 98/60 (11-07-19 @ 04:14) (82/52 - 114/65)  RR: 16 (11-07-19 @ 04:14) (16 - 18)  SpO2: 96% (11-07-19 @ 04:14) (95% - 98%)    LABS:                        8.9    x     )-----------( x        ( 06 Nov 2019 09:05 )             28.0     11-06    138  |  105  |  17  ----------------------------<  108<H>  4.2   |  26  |  0.65    Ca    8.4<L>      06 Nov 2019 06:44      PHYSICAL EXAM  GEN: NAD    RLE:  Dressing c/d/i  Surrounding skin slightly erythematous, no excessive warmth  SCDs in place  L2-S1 SILT  +TA/EHL/FHL/GSC  + DP/PT pulses  Compartments soft and compressible  Calves nontender Patient seen and examined at bedside, resting comfortably. No overnight events. Pain well controlled. Denies fevers, chills, chest pain, dyspnea, abdominal pain, n/v, numbness/tingling. No complaints at this time.     VITAL SIGNS  T(C): 38 (11-07-19 @ 04:14), Max: 39.2 (11-06-19 @ 16:05)  HR: 84 (11-07-19 @ 04:14) (84 - 107)  BP: 98/60 (11-07-19 @ 04:14) (82/52 - 114/65)  RR: 16 (11-07-19 @ 04:14) (16 - 18)  SpO2: 96% (11-07-19 @ 04:14) (95% - 98%)    LABS:                        8.9    x     )-----------( x        ( 06 Nov 2019 09:05 )             28.0     11-06    138  |  105  |  17  ----------------------------<  108<H>  4.2   |  26  |  0.65    Ca    8.4<L>      06 Nov 2019 06:44      PHYSICAL EXAM  GEN: NAD    RLE:  Dressing c/d/i  Surrounding skin slightly erythematous/ecchymotic, no excessive warmth  SCDs in place  L2-S1 SILT  +TA/EHL/FHL/GSC  + DP/PT pulses  Compartments soft and compressible  Calves nontender

## 2019-11-07 NOTE — PROGRESS NOTE ADULT - SUBJECTIVE AND OBJECTIVE BOX
Patient is a 59y old  Female who presents with a chief complaint of R Anterior YENI (07 Nov 2019 06:32)      INTERVAL /OVERNIGHT EVENTS: feels ok, now febrile and anemic    MEDICATIONS  (STANDING):  aspirin enteric coated 325 milliGRAM(s) Oral two times a day  ferrous    sulfate 325 milliGRAM(s) Oral daily  multivitamin 1 Tablet(s) Oral daily  pantoprazole    Tablet 40 milliGRAM(s) Oral before breakfast  polyethylene glycol 3350 17 Gram(s) Oral daily  simvastatin 20 milliGRAM(s) Oral at bedtime  sodium chloride 0.9%. 1000 milliLiter(s) (75 mL/Hr) IV Continuous <Continuous>    MEDICATIONS  (PRN):  acetaminophen   Tablet .. 650 milliGRAM(s) Oral every 4 hours PRN Temp greater or equal to 38C (100.4F)  aluminum hydroxide/magnesium hydroxide/simethicone Suspension 30 milliLiter(s) Oral four times a day PRN Indigestion  bisacodyl Suppository 10 milliGRAM(s) Rectal daily PRN If no bowel movement by POD#2  ondansetron Injectable 4 milliGRAM(s) IV Push every 6 hours PRN Nausea and/or Vomiting  senna 2 Tablet(s) Oral at bedtime PRN Constipation  traMADol 25 milliGRAM(s) Oral every 6 hours PRN Moderate Pain (4 - 6)  traMADol 50 milliGRAM(s) Oral every 6 hours PRN Severe Pain (7 - 10)      Allergies    adhesives (Rash)  No Known Drug Allergies    Intolerances        REVIEW OF SYSTEMS:  CONSTITUTIONAL: No fever, weight loss, or fatigue  EYES: No eye pain, visual disturbances, or discharge  ENMT:  No difficulty hearing, tinnitus, vertigo; No sinus or throat pain  NECK: No pain or stiffness  RESPIRATORY: No cough, wheezing, chills or hemoptysis; No shortness of breath  CARDIOVASCULAR: No chest pain, palpitations, dizziness, or leg swelling  GASTROINTESTINAL: No abdominal or epigastric pain. No nausea, vomiting, or hematemesis; No diarrhea or constipation. No melena or hematochezia.  GENITOURINARY: No dysuria, frequency, hematuria, or incontinence  NEUROLOGICAL: No headaches, memory loss, loss of strength, numbness, or tremors  SKIN: No itching, burning, rashes, or lesions   LYMPH NODES: No enlarged glands  ENDOCRINE: No heat or cold intolerance; No hair loss; No polydipsia or polyuria  MUSCULOSKELETAL: No joint pain or swelling; No muscle, back, or extremity pain  PSYCHIATRIC: No depression, anxiety, mood swings, or difficulty sleeping  HEME/LYMPH: No easy bruising, or bleeding gums  ALLERGY AND IMMUNOLOGIC: No hives or eczema    Vital Signs Last 24 Hrs  T(C): 37.2 (07 Nov 2019 15:55), Max: 38.7 (06 Nov 2019 20:28)  T(F): 98.9 (07 Nov 2019 15:55), Max: 101.6 (06 Nov 2019 20:28)  HR: 91 (07 Nov 2019 15:55) (83 - 96)  BP: 110/68 (07 Nov 2019 15:55) (90/53 - 149/79)  BP(mean): --  RR: 16 (07 Nov 2019 15:55) (16 - 18)  SpO2: 96% (07 Nov 2019 15:55) (95% - 96%)    PHYSICAL EXAM:  GENERAL: NAD, well-groomed, well-developed  HEAD:  Atraumatic, Normocephalic  EYES: EOMI, PERRLA, conjunctiva and sclera clear  ENMT: No tonsillar erythema, exudates, or enlargement; Moist mucous membranes, Good dentition, No lesions  NECK: Supple, No JVD, Normal thyroid  NERVOUS SYSTEM:  Alert & Oriented X3, Good concentration; Motor Strength 5/5 B/L upper and lower extremities; DTRs 2+ intact and symmetric  CHEST/LUNG: Clear to auscultation bilaterally; No rales, rhonchi, wheezing, or rubs  HEART: Regular rate and rhythm; No murmurs, rubs, or gallops  ABDOMEN: Soft, Nontender, Nondistended; Bowel sounds present  EXTREMITIES:  2+ Peripheral Pulses, No clubbing, cyanosis, or edema  LYMPH: No lymphadenopathy noted  SKIN: No rashes or lesions    LABS:                        8.1    11.63 )-----------( 324      ( 07 Nov 2019 06:31 )             25.4     07 Nov 2019 06:31    142    |  111    |  15     ----------------------------<  109    4.7     |  27     |  0.55     Ca    8.4        07 Nov 2019 06:31          CAPILLARY BLOOD GLUCOSE          RADIOLOGY & ADDITIONAL TESTS:    Notes Reviewed:  [x ] YES  [ ] NO    Care Discussed with Consultants/Other Providers [x ] YES  [ ] NO

## 2019-11-08 DIAGNOSIS — R50.9 FEVER, UNSPECIFIED: ICD-10-CM

## 2019-11-08 LAB
ANION GAP SERPL CALC-SCNC: 6 MMOL/L — SIGNIFICANT CHANGE UP (ref 5–17)
APPEARANCE UR: CLEAR — SIGNIFICANT CHANGE UP
BACTERIA # UR AUTO: ABNORMAL
BILIRUB UR-MCNC: NEGATIVE — SIGNIFICANT CHANGE UP
BUN SERPL-MCNC: 8 MG/DL — SIGNIFICANT CHANGE UP (ref 7–23)
CALCIUM SERPL-MCNC: 8.4 MG/DL — LOW (ref 8.5–10.1)
CHLORIDE SERPL-SCNC: 111 MMOL/L — HIGH (ref 96–108)
CO2 SERPL-SCNC: 25 MMOL/L — SIGNIFICANT CHANGE UP (ref 22–31)
COLOR SPEC: YELLOW — SIGNIFICANT CHANGE UP
CREAT SERPL-MCNC: 0.42 MG/DL — LOW (ref 0.5–1.3)
DIFF PNL FLD: ABNORMAL
EPI CELLS # UR: SIGNIFICANT CHANGE UP
GLUCOSE SERPL-MCNC: 113 MG/DL — HIGH (ref 70–99)
GLUCOSE UR QL: NEGATIVE — SIGNIFICANT CHANGE UP
HCT VFR BLD CALC: 28.8 % — LOW (ref 34.5–45)
HGB BLD-MCNC: 9.7 G/DL — LOW (ref 11.5–15.5)
KETONES UR-MCNC: NEGATIVE — SIGNIFICANT CHANGE UP
LEUKOCYTE ESTERASE UR-ACNC: NEGATIVE — SIGNIFICANT CHANGE UP
MCHC RBC-ENTMCNC: 29.8 PG — SIGNIFICANT CHANGE UP (ref 27–34)
MCHC RBC-ENTMCNC: 33.7 GM/DL — SIGNIFICANT CHANGE UP (ref 32–36)
MCV RBC AUTO: 88.6 FL — SIGNIFICANT CHANGE UP (ref 80–100)
NITRITE UR-MCNC: NEGATIVE — SIGNIFICANT CHANGE UP
NRBC # BLD: 0 /100 WBCS — SIGNIFICANT CHANGE UP (ref 0–0)
PH UR: 5 — SIGNIFICANT CHANGE UP (ref 5–8)
PLATELET # BLD AUTO: 309 K/UL — SIGNIFICANT CHANGE UP (ref 150–400)
POTASSIUM SERPL-MCNC: 3.9 MMOL/L — SIGNIFICANT CHANGE UP (ref 3.5–5.3)
POTASSIUM SERPL-SCNC: 3.9 MMOL/L — SIGNIFICANT CHANGE UP (ref 3.5–5.3)
PROCALCITONIN SERPL-MCNC: 0.15 NG/ML — HIGH (ref 0–0.04)
PROT UR-MCNC: 25 MG/DL
RBC # BLD: 3.25 M/UL — LOW (ref 3.8–5.2)
RBC # FLD: 14.6 % — HIGH (ref 10.3–14.5)
RBC CASTS # UR COMP ASSIST: ABNORMAL /HPF (ref 0–4)
SODIUM SERPL-SCNC: 142 MMOL/L — SIGNIFICANT CHANGE UP (ref 135–145)
SP GR SPEC: 1.01 — SIGNIFICANT CHANGE UP (ref 1.01–1.02)
UROBILINOGEN FLD QL: NEGATIVE — SIGNIFICANT CHANGE UP
WBC # BLD: 11.61 K/UL — HIGH (ref 3.8–10.5)
WBC # FLD AUTO: 11.61 K/UL — HIGH (ref 3.8–10.5)
WBC UR QL: NEGATIVE — SIGNIFICANT CHANGE UP

## 2019-11-08 PROCEDURE — 71045 X-RAY EXAM CHEST 1 VIEW: CPT | Mod: 26

## 2019-11-08 RX ORDER — VANCOMYCIN HCL 1 G
1000 VIAL (EA) INTRAVENOUS EVERY 12 HOURS
Refills: 0 | Status: DISCONTINUED | OUTPATIENT
Start: 2019-11-08 | End: 2019-11-10

## 2019-11-08 RX ORDER — VANCOMYCIN HCL 1 G
VIAL (EA) INTRAVENOUS
Refills: 0 | Status: DISCONTINUED | OUTPATIENT
Start: 2019-11-08 | End: 2019-11-08

## 2019-11-08 RX ADMIN — SODIUM CHLORIDE 50 MILLILITER(S): 9 INJECTION INTRAMUSCULAR; INTRAVENOUS; SUBCUTANEOUS at 08:39

## 2019-11-08 RX ADMIN — SIMVASTATIN 20 MILLIGRAM(S): 20 TABLET, FILM COATED ORAL at 21:44

## 2019-11-08 RX ADMIN — Medication 650 MILLIGRAM(S): at 01:59

## 2019-11-08 RX ADMIN — Medication 1 TABLET(S): at 11:23

## 2019-11-08 RX ADMIN — Medication 325 MILLIGRAM(S): at 05:59

## 2019-11-08 RX ADMIN — Medication 650 MILLIGRAM(S): at 16:16

## 2019-11-08 RX ADMIN — POLYETHYLENE GLYCOL 3350 17 GRAM(S): 17 POWDER, FOR SOLUTION ORAL at 11:23

## 2019-11-08 RX ADMIN — Medication 325 MILLIGRAM(S): at 11:23

## 2019-11-08 RX ADMIN — Medication 250 MILLIGRAM(S): at 21:44

## 2019-11-08 RX ADMIN — PANTOPRAZOLE SODIUM 40 MILLIGRAM(S): 20 TABLET, DELAYED RELEASE ORAL at 05:59

## 2019-11-08 RX ADMIN — Medication 650 MILLIGRAM(S): at 17:39

## 2019-11-08 RX ADMIN — Medication 650 MILLIGRAM(S): at 00:39

## 2019-11-08 RX ADMIN — SODIUM CHLORIDE 75 MILLILITER(S): 9 INJECTION INTRAMUSCULAR; INTRAVENOUS; SUBCUTANEOUS at 02:25

## 2019-11-08 RX ADMIN — Medication 325 MILLIGRAM(S): at 17:35

## 2019-11-08 NOTE — PROGRESS NOTE ADULT - ASSESSMENT
Assessment:  59y Female s/p R anterior YENI POD #3  -will change dressing to 4x4/ACE possible allergy to aquacel  -Pain control  -DVT ppx: SCDs,   -WBAT/OOB with assistance as needed  -PT/OT  -Anterior hip precautions  -Ice as needed  -Encourage incentive spirometry  -DC planning: Home  -Will discuss with attending, Dr. Beltran, and will advise if plan changes.

## 2019-11-08 NOTE — PROGRESS NOTE ADULT - SUBJECTIVE AND OBJECTIVE BOX
Patient is a 59y old  Female who presents with a chief complaint of R Anterior YENI (2019 06:36)      INTERVAL /OVERNIGHT EVENTS: feels ok, still febrile    MEDICATIONS  (STANDING):  aspirin enteric coated 325 milliGRAM(s) Oral two times a day  ferrous    sulfate 325 milliGRAM(s) Oral daily  multivitamin 1 Tablet(s) Oral daily  pantoprazole    Tablet 40 milliGRAM(s) Oral before breakfast  polyethylene glycol 3350 17 Gram(s) Oral daily  simvastatin 20 milliGRAM(s) Oral at bedtime    MEDICATIONS  (PRN):  acetaminophen   Tablet .. 650 milliGRAM(s) Oral every 4 hours PRN Temp greater or equal to 38C (100.4F)  aluminum hydroxide/magnesium hydroxide/simethicone Suspension 30 milliLiter(s) Oral four times a day PRN Indigestion  bisacodyl Suppository 10 milliGRAM(s) Rectal daily PRN If no bowel movement by POD#2  senna 2 Tablet(s) Oral at bedtime PRN Constipation  traMADol 25 milliGRAM(s) Oral every 6 hours PRN Moderate Pain (4 - 6)  traMADol 50 milliGRAM(s) Oral every 6 hours PRN Severe Pain (7 - 10)      Allergies    adhesives (Rash)  No Known Drug Allergies    Intolerances        REVIEW OF SYSTEMS:  CONSTITUTIONAL: No fever, weight loss, or fatigue  EYES: No eye pain, visual disturbances, or discharge  ENMT:  No difficulty hearing, tinnitus, vertigo; No sinus or throat pain  NECK: No pain or stiffness  RESPIRATORY: No cough, wheezing, chills or hemoptysis; No shortness of breath  CARDIOVASCULAR: No chest pain, palpitations, dizziness, or leg swelling  GASTROINTESTINAL: No abdominal or epigastric pain. No nausea, vomiting, or hematemesis; No diarrhea or constipation. No melena or hematochezia.  GENITOURINARY: No dysuria, frequency, hematuria, or incontinence  NEUROLOGICAL: No headaches, memory loss, loss of strength, numbness, or tremors  SKIN: No itching, burning, rashes, or lesions   LYMPH NODES: No enlarged glands  ENDOCRINE: No heat or cold intolerance; No hair loss; No polydipsia or polyuria  MUSCULOSKELETAL: + joint pain or swelling; No muscle, back, or extremity pain  PSYCHIATRIC: No depression, anxiety, mood swings, or difficulty sleeping  HEME/LYMPH: No easy bruising, or bleeding gums  ALLERGY AND IMMUNOLOGIC: No hives or eczema    Vital Signs Last 24 Hrs  T(C): 37.1 (2019 07:28), Max: 38.8 (2019 00:37)  T(F): 98.7 (2019 07:28), Max: 101.9 (2019 00:37)  HR: 89 (2019 07:) (89 - 114)  BP: 145/75 (2019 07:) (110/68 - 145/75)  BP(mean): --  RR: 18 (2019 07:28) (16 - 18)  SpO2: 98% (2019 07:) (94% - 98%)    PHYSICAL EXAM:  GENERAL: NAD, well-groomed, well-developed  HEAD:  Atraumatic, Normocephalic  EYES: EOMI, PERRLA, conjunctiva and sclera clear  ENMT: No tonsillar erythema, exudates, or enlargement; Moist mucous membranes, Good dentition, No lesions  NECK: Supple, No JVD, Normal thyroid  NERVOUS SYSTEM:  Alert & Oriented X3, Good concentration; Motor Strength 5/5 B/L upper and lower extremities; DTRs 2+ intact and symmetric  CHEST/LUNG: Clear to auscultation bilaterally; No rales, rhonchi, wheezing, or rubs  HEART: Regular rate and rhythm; No murmurs, rubs, or gallops  ABDOMEN: Soft, Nontender, Nondistended; Bowel sounds present  EXTREMITIES:  2+ Peripheral Pulses, No clubbing, cyanosis, or edema  LYMPH: No lymphadenopathy noted  SKIN: No rashes or lesions    LABS:                        9.7    11.61 )-----------( 309      ( 2019 07:55 )             28.8     2019 07:55    142    |  111    |  8      ----------------------------<  113    3.9     |  25     |  0.42     Ca    8.4        2019 07:55        Urinalysis Basic - ( 2019 09:27 )    Color: Yellow / Appearance: Clear / S.010 / pH: x  Gluc: x / Ketone: Negative  / Bili: Negative / Urobili: Negative   Blood: x / Protein: 25 mg/dL / Nitrite: Negative   Leuk Esterase: Negative / RBC: 3-5 /HPF / WBC Negative   Sq Epi: x / Non Sq Epi: Few / Bacteria: Occasional      CAPILLARY BLOOD GLUCOSE          RADIOLOGY & ADDITIONAL TESTS:    Notes Reviewed:  [x ] YES  [ ] NO    Care Discussed with Consultants/Other Providers [x ] YES  [ ] NO

## 2019-11-08 NOTE — PROGRESS NOTE ADULT - SUBJECTIVE AND OBJECTIVE BOX
Patient seen and examined at bedside, resting comfortably. Pt was febrile overnight. Pain well controlled. Denies  chills, chest pain, dyspnea, abdominal pain, n/v, numbness/tingling. No complaints at this time.     Vital Signs Last 24 Hrs  T(C): 37 (08 Nov 2019 04:55), Max: 38.8 (08 Nov 2019 00:37)  T(F): 98.6 (08 Nov 2019 04:55), Max: 101.9 (08 Nov 2019 00:37)  HR: 99 (08 Nov 2019 01:59) (83 - 114)  BP: 129/73 (08 Nov 2019 00:37) (109/61 - 149/79)  BP(mean): --  RR: 18 (08 Nov 2019 00:37) (16 - 18)  SpO2: 94% (08 Nov 2019 00:37) (94% - 96%)      PHYSICAL EXAM  GEN: NAD    RLE:  Dressing c/d/i  Surrounding skin mildly erythematous/ecchymotic, slightly warm  SCDs in place  L2-S1 SILT  +TA/EHL/FHL/GSC  + DP/PT pulses  Compartments soft and compressible  Calves nontender

## 2019-11-08 NOTE — CONSULT NOTE ADULT - SUBJECTIVE AND OBJECTIVE BOX
Infectious Diseases Consult by Andriy Quezada MD    Reason for Consult :    HPI:  58 yo female scheduled for right hip replacement on 19 with Dr Beltran.  Patient states she had pain and decreased range of motion in eht right hip for a few years.  Pain is intermittent better when sitting.  Pain is 8/10 when standing and walking . She had the THR by  anterior approach  done on 19 . She had lots of bruising from the retractor held during surgery and she has been noted to have erythema from contusion of surgery. No change in the erythema today . She has allergies to adhesive tape     Last night she spiked fever early this am to 101.2 . She has hx of CIDP and gets IVIG once a month. She has minimal cough . Denies any SOB .         Past Medical & Surgical Hx:  PAST MEDICAL & SURGICAL HISTORY:  Unilateral primary osteoarthritis, right hip  Demyelinating neuropathy  H/O: rheumatic fever  Thyroid cyst  Osteoarthritis, unspecified osteoarthritis type, unspecified site  Anemia  CIDP (chronic inflammatory demyelinating polyneuropathy)  Hyperlipidemia  Status post total right knee replacement  S/P hip replacement: left hip (5/15/14), Revision (14)  S/P knee surgery: reconstruction of right patella (2010)  S/P carpal tunnel release: left and right ()      Social History-- Retired   EtOH: denies   Tobacco: denies  Drug Use: denies       FAMILY HISTORY:  Family history of brain cancer (Sibling): Sister  CAD (coronary artery disease) (Mother): Mother  Diabetes mellitus (Father): Mother &amp; Father      Allergies    adhesives (Rash)  No Known Drug Allergies    Intolerances        Home/ Out patient  Medications :  Home Medications:  aspirin 81 mg oral tablet: 1 tab(s) orally once a day (2019 07:24)  ferrous sulfate 325 mg oral delayed release tablet: 1 tab(s) orally once a day (2019 07:24)  Gamunex:   once a month  last 10/16 /19 (2019 07:24)  metoprolol succinate 25 mg oral capsule, extended release: 1 cap(s) orally once a day (2019 07:24)  Multiple Vitamins oral tablet: 1 tab(s) orally once a day (2019 07:24)  simvastatin 20 mg oral tablet: 1 tab(s) orally once a day (at bedtime) (2019 07:24)      Current Inpatient Medications :    ANTIBIOTICS:       OTHER RELEVANT MEDICATIONS :  acetaminophen   Tablet .. 650 milliGRAM(s) Oral every 4 hours PRN  aluminum hydroxide/magnesium hydroxide/simethicone Suspension 30 milliLiter(s) Oral four times a day PRN  aspirin enteric coated 325 milliGRAM(s) Oral two times a day  bisacodyl Suppository 10 milliGRAM(s) Rectal daily PRN  ferrous    sulfate 325 milliGRAM(s) Oral daily  multivitamin 1 Tablet(s) Oral daily  pantoprazole    Tablet 40 milliGRAM(s) Oral before breakfast  polyethylene glycol 3350 17 Gram(s) Oral daily  senna 2 Tablet(s) Oral at bedtime PRN  simvastatin 20 milliGRAM(s) Oral at bedtime  sodium chloride 0.9%. 1000 milliLiter(s) IV Continuous <Continuous>  traMADol 25 milliGRAM(s) Oral every 6 hours PRN  traMADol 50 milliGRAM(s) Oral every 6 hours PRN      ROS:  CONSTITUTIONAL:  Positive for fever or chills, feels well, good appetite  EYES:  Negative  blurry vision or double vision  CARDIOVASCULAR:  Negative for chest pain or palpitations  RESPIRATORY:  Negative for cough, wheezing, or SOB   GASTROINTESTINAL:  Negative for nausea, vomiting, diarrhea, constipation, or abdominal pain  GENITOURINARY:  Negative frequency, urgency , dysuria or hematuria   NEUROLOGIC:  No headache, confusion, dizziness, lightheadedness  All other systems were reviewed and are negative          I&O's Detail    2019 07:  -  2019 07:00  --------------------------------------------------------  IN:    Oral Fluid: 480 mL    sodium chloride 0.9%.: 900 mL  Total IN: 1380 mL    OUT:    Voided: 2000 mL  Total OUT: 2000 mL    Total NET: -620 mL      2019 07:  -  2019 09:36  --------------------------------------------------------  IN:    sodium chloride 0.9%.: 50 mL  Total IN: 50 mL    OUT:  Total OUT: 0 mL    Total NET: 50 mL          Physical Exam:  Vital Signs Last 24 Hrs  T(C): 37.1 (2019 07:28), Max: 38.8 (2019 00:37)  T(F): 98.7 (2019 07:), Max: 101.9 (2019 00:37)  HR: 89 (:) (85 - 114)  BP: 145/75 (:) (110/68 - 149/79)  BP(mean): --  RR: 18 (:) (16 - 18)  SpO2: 98% (:) (94% - 98%)      General: well developed well nourished, in no acute distress  Eyes: sclera anicteric, pupils equal and reactive to light  ENMT: buccal mucosa moist, pharynx not injected  Neck: supple, trachea midline  Lungs: clear, no wheeze/rhonchi  Cardiovascular: regular rate and rhythm, S1 S2  Abdomen: soft, nontender, no organomegaly present, bowel sounds normal  Neurological:  alert and oriented x3, Cranial Nerves II-XII grossly intact  Skin:no increased ecchymosis/petechiae/purpura  Lymph Nodes: no palpable cervical/supraclavicular lymph nodes enlargements  Extremities: no cyanosis/clubbing, Right hip - Dressing C/D/I , staples intact, slight erythema along the dressing line  and drainage from the upper staples in groin , Incisional tenderness +     Labs:                 9.7    11.61  )----------(  309       ( 2019 07:55 )               28.8      142    |  111    |  8      ----------------------------<  113        ( 2019 07:55 )  3.9     |  25     |  0.42     Ca    8.4        ( 2019 07:55 )          Urinalysis Basic - ( 2019 09:27 )    Color: Yellow / Appearance: Clear / S.010 / pH: x  Gluc: x / Ketone: Negative  / Bili: Negative / Urobili: Negative   Blood: x / Protein: 25 mg/dL / Nitrite: Negative   Leuk Esterase: Negative / RBC: 3-5 /HPF / WBC Negative   Sq Epi: x / Non Sq Epi: Few / Bacteria: Occasional    RECENT CULTURES:  Culture - Nose . (14 @ 14:53)    Specimen Source: .Nose Nose    Culture Results:   No MRSA or MSSA isolated      RADIOLOGY & ADDITIONAL STUDIES:   Xray Chest 1 View AP/PA (19 @ 09:05) >  Findings:  Lines: None    Heart/Mediastinum/Lungs: The heart size is normal.The lungs are   clear.There are no pleural effusions. Elevation of the right   hemidiaphragm.    Impression:    As above.      Assessment :     58 yo female hx of CIDP in IVIG, severe OA underwent a right THR by anterior approach on 19 , now has post op fever to 101. She has contusion of the right anterior thigh and possible allergic reaction to the dressing she had , she has allergies to adhesives . Clinically has no sign of infection . If the erythema does not improve may need abx of early cellulitis . Try to keep area dry     Plan :   - observe off abx   - follow sepsis work up, add PCT and urine cs   - local wound care as per ortho , use paper tape   - trend cbc  -incentive spirometry     case discussed with Ortho team and Dr. Coto     Continue with present regime .  Appropriate use of antibiotics and adverse effects reviewed.      I have discussed the above plan of care with patient in detail. She  expressed understanding of the treatment plan . Risks, benefits and alternatives discussed in detail. I have asked if she has any questions or concerns and appropriately addressed them to the best of my ability .      > 65  minutes spent in direct patient care reviewing  the notes, lab data/ imaging , discussion with multidisciplinary team. All questions were addressed and answered to the best of my capacity .    Thank you for allowing me to participate in the care of your patient .      Andriy Quezada MD  [phone]
60 yo female scheduled for right hip replacement on 11/5/19 with Dr Beltran.  Patient states she had pain and decreased range of motion in eht right hip for a few years.  Pain is intermittent better when sitting.  Pain is 8/10 when standing and walking.  patient was seen in post op surgical unit.     Allergies:  	No Known Drug Allergies:   	adhesives: Miscellaneous, Rash    Home Medications:   * Patient Currently Takes Medications as of 22-Oct-2019 12:03 documented in Structured Notes  · 	Multiple Vitamins oral tablet: Last Dose Taken:  , 1 tab(s) orally once a day  · 	simvastatin 20 mg oral tablet: Last Dose Taken:  , 1 tab(s) orally once a day (at bedtime)  · 	ferrous sulfate 325 mg oral delayed release tablet: Last Dose Taken:  , 1 tab(s) orally once a day  · 	aspirin 81 mg oral tablet: Last Dose Taken:  , 1 tab(s) orally once a day  · 	Gamunex: Last Dose Taken:  ,   once a month  last 10/16 /19  · 	metoprolol succinate 25 mg oral capsule, extended release: Last Dose Taken:  , 1 cap(s  ) orally once a day    PAST MEDICAL HISTORY:  Anemia     CIDP (chronic inflammatory demyelinating polyneuropathy)     Demyelinating neuropathy     H/O: rheumatic fever     Hyperlipidemia     Osteoarthritis, unspecified osteoarthritis type, unspecified site     Thyroid cyst     Unilateral primary osteoarthritis, right hip.     PAST SURGICAL HISTORY:  S/P carpal tunnel release left and right (1986)    S/P hip replacement left hip (5/15/14), Revision (6/5/14)    S/P knee surgery reconstruction of right patella (12/2010)    Status post total right knee replacement.     FAMILY HISTORY:  Father  Still living? No  Diabetes mellitus, Age at diagnosis: Age Unknown    Mother  Still living? Yes, Estimated age: 81-90  CAD (coronary artery disease), Age at diagnosis: Age Unknown    Sibling  Still living? No  Family history of brain cancer, Age at diagnosis: 41-50.    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	  · Alcohol Type	wine	  · Alcohol Frequency	2-4 times/mo	  · Alcohol Amount	1-2 drinks	    Tobacco Usage:  · Tobacco Usage: Former smoker	  · Tobacco Type: cigarettes	  · Last Tobacco Use (dd-mmm-yy): 01-Jan-1994	  · Number of Packs per Day: 0.25	  · Number of yrs: 2	  · Pack yrs: 0	    Review of Systems:   · General	negative	  · Skin/Breast	negative	  · Ophthalmologic	negative	  · Negative ENMT Symptoms	no ear pain; no vertigo; no sinus symptoms; no nasal congestion; no nasal discharge; no nasal obstruction	  · ENMT Symptoms	hearing difficulty  tinnitus	  · ENMT Comments	hearing aids	  · Respiratory and Thorax	negative	  · Cardiovascular	negative	  · Gastrointestinal	negative	  · Musculoskeletal Symptoms	arthritis	  · Musculoskeletal Comments	ankles swell intermittently	  · Neurological Comments	numbness in both feet	  · Psychiatric	negative	  · Negative Hematology Symptoms	no gum bleeding; no nose bleeding	  · Hematology Symptoms	bruises easily	  · Endocrine	negative	  · Allergic/Immunologic	negative	    Physical Exam:  · Constitutional	Well-developed, well nourished	  · Eyes	EOMI; PERRL; no drainage or redness	  · ENMT	detailed exam	  · ENMT Details	mouth; pharynx	  · Mouth	normal mouth and gums; moist	  · Pharynx	normal	  · Neck	No bruits; no thyromegaly or nodules	  · Breasts	not examined	  · Back	No deformity or limitation of movement	  · Respiratory	Breath Sounds equal & clear to percussion & auscultation, no accessory muscle use	  · Cardiovascular	Regular rate & rhythm, normal S1, S2; no murmurs, gallops or rubs; no S3, S4	  · Gastrointestinal	Soft, non-tender, no hepatosplenomegaly, normal bowel sounds	  · Genitourinary	not examined	  · Rectal	not examined	  · Extremities	No cyanosis, clubbing or edema	  · Vascular	detailed exam	  · Radial Pulse	right normal; left normal	  · PT Pulse	right normal; left normal	  · Neurological	Alert & oriented; no sensory, motor or coordination deficits, normal reflexes	  · Skin	No lesions; no rash

## 2019-11-09 DIAGNOSIS — D64.9 ANEMIA, UNSPECIFIED: ICD-10-CM

## 2019-11-09 LAB
ANION GAP SERPL CALC-SCNC: 5 MMOL/L — SIGNIFICANT CHANGE UP (ref 5–17)
BASOPHILS # BLD AUTO: 0.03 K/UL — SIGNIFICANT CHANGE UP (ref 0–0.2)
BASOPHILS NFR BLD AUTO: 0.3 % — SIGNIFICANT CHANGE UP (ref 0–2)
BUN SERPL-MCNC: 7 MG/DL — SIGNIFICANT CHANGE UP (ref 7–23)
CALCIUM SERPL-MCNC: 8.6 MG/DL — SIGNIFICANT CHANGE UP (ref 8.5–10.1)
CHLORIDE SERPL-SCNC: 108 MMOL/L — SIGNIFICANT CHANGE UP (ref 96–108)
CO2 SERPL-SCNC: 25 MMOL/L — SIGNIFICANT CHANGE UP (ref 22–31)
CREAT SERPL-MCNC: 0.55 MG/DL — SIGNIFICANT CHANGE UP (ref 0.5–1.3)
CRP SERPL-MCNC: 20.63 MG/DL — HIGH (ref 0–0.4)
EOSINOPHIL # BLD AUTO: 0.34 K/UL — SIGNIFICANT CHANGE UP (ref 0–0.5)
EOSINOPHIL NFR BLD AUTO: 3.7 % — SIGNIFICANT CHANGE UP (ref 0–6)
ERYTHROCYTE [SEDIMENTATION RATE] IN BLOOD: 111 MM/HR — HIGH (ref 0–20)
GLUCOSE SERPL-MCNC: 155 MG/DL — HIGH (ref 70–99)
HCT VFR BLD CALC: 28.5 % — LOW (ref 34.5–45)
HGB BLD-MCNC: 9.2 G/DL — LOW (ref 11.5–15.5)
IMM GRANULOCYTES NFR BLD AUTO: 0.8 % — SIGNIFICANT CHANGE UP (ref 0–1.5)
LYMPHOCYTES # BLD AUTO: 0.98 K/UL — LOW (ref 1–3.3)
LYMPHOCYTES # BLD AUTO: 10.7 % — LOW (ref 13–44)
MCHC RBC-ENTMCNC: 28.3 PG — SIGNIFICANT CHANGE UP (ref 27–34)
MCHC RBC-ENTMCNC: 32.3 GM/DL — SIGNIFICANT CHANGE UP (ref 32–36)
MCV RBC AUTO: 87.7 FL — SIGNIFICANT CHANGE UP (ref 80–100)
MONOCYTES # BLD AUTO: 0.5 K/UL — SIGNIFICANT CHANGE UP (ref 0–0.9)
MONOCYTES NFR BLD AUTO: 5.5 % — SIGNIFICANT CHANGE UP (ref 2–14)
NEUTROPHILS # BLD AUTO: 7.23 K/UL — SIGNIFICANT CHANGE UP (ref 1.8–7.4)
NEUTROPHILS NFR BLD AUTO: 79 % — HIGH (ref 43–77)
NRBC # BLD: 0 /100 WBCS — SIGNIFICANT CHANGE UP (ref 0–0)
PLATELET # BLD AUTO: 433 K/UL — HIGH (ref 150–400)
POTASSIUM SERPL-MCNC: 4 MMOL/L — SIGNIFICANT CHANGE UP (ref 3.5–5.3)
POTASSIUM SERPL-SCNC: 4 MMOL/L — SIGNIFICANT CHANGE UP (ref 3.5–5.3)
RBC # BLD: 3.25 M/UL — LOW (ref 3.8–5.2)
RBC # FLD: 14.6 % — HIGH (ref 10.3–14.5)
SODIUM SERPL-SCNC: 138 MMOL/L — SIGNIFICANT CHANGE UP (ref 135–145)
URATE SERPL-MCNC: 3.6 MG/DL — SIGNIFICANT CHANGE UP (ref 2.5–7)
WBC # BLD: 9.15 K/UL — SIGNIFICANT CHANGE UP (ref 3.8–10.5)
WBC # FLD AUTO: 9.15 K/UL — SIGNIFICANT CHANGE UP (ref 3.8–10.5)

## 2019-11-09 PROCEDURE — 93970 EXTREMITY STUDY: CPT | Mod: 26

## 2019-11-09 PROCEDURE — 73562 X-RAY EXAM OF KNEE 3: CPT | Mod: 26,RT

## 2019-11-09 RX ADMIN — Medication 325 MILLIGRAM(S): at 17:23

## 2019-11-09 RX ADMIN — POLYETHYLENE GLYCOL 3350 17 GRAM(S): 17 POWDER, FOR SOLUTION ORAL at 12:29

## 2019-11-09 RX ADMIN — PANTOPRAZOLE SODIUM 40 MILLIGRAM(S): 20 TABLET, DELAYED RELEASE ORAL at 05:38

## 2019-11-09 RX ADMIN — Medication 250 MILLIGRAM(S): at 09:11

## 2019-11-09 RX ADMIN — Medication 325 MILLIGRAM(S): at 12:29

## 2019-11-09 RX ADMIN — Medication 250 MILLIGRAM(S): at 21:36

## 2019-11-09 RX ADMIN — Medication 1 TABLET(S): at 12:29

## 2019-11-09 RX ADMIN — SIMVASTATIN 20 MILLIGRAM(S): 20 TABLET, FILM COATED ORAL at 21:35

## 2019-11-09 RX ADMIN — Medication 325 MILLIGRAM(S): at 05:38

## 2019-11-09 NOTE — PROGRESS NOTE ADULT - SUBJECTIVE AND OBJECTIVE BOX
ID progress note    Name: GRACIE CASH  Age: 59y  Gender: Female  MRN: [id number]    Interval History-- Events noted, febrile to 102.5 , so abx started . Also complaints of pain an swelling of right knee, she has prior right TKR . The localised irritation on right hip persists   Notes reviewed    Past Medical History--  Unilateral primary osteoarthritis, right hip  Demyelinating neuropathy  H/O: rheumatic fever  Thyroid cyst  Osteoarthritis, unspecified osteoarthritis type, unspecified site  Anemia  CIDP (chronic inflammatory demyelinating polyneuropathy)  Hyperlipidemia  Status post total right knee replacement  S/P hip replacement  S/P knee surgery  S/P carpal tunnel release      For details regarding the patient's social history, family history, and other miscellaneous elements, please refer the initial infectious diseases consultation and/or the admitting history and physical examination for this admission.    Allergies--  Allergies    adhesives (Rash)  No Known Drug Allergies    Intolerances        Medications--  Antibiotics:  vancomycin  IVPB 1000 milliGRAM(s) IV Intermittent every 12 hours    Immunologic:    Other:  acetaminophen   Tablet .. PRN  aluminum hydroxide/magnesium hydroxide/simethicone Suspension PRN  aspirin enteric coated  bisacodyl Suppository PRN  ferrous    sulfate  multivitamin  pantoprazole    Tablet  polyethylene glycol 3350  senna PRN  simvastatin  traMADol PRN  traMADol PRN      Review of Systems--  A 10-point review of systems was obtained.     Pertinent positives and negatives--  Constitutional: No fevers. No Chills. No Rigors.   Cardiovascular: No chest pain. No palpitations.  Respiratory: No shortness of breath. No cough.  Gastrointestinal: No nausea or vomiting. No diarrhea or constipation.   Psychiatric: Pleasant. Appropriate affect.    Review of systems otherwise negative except as previously noted.    Physical Examination--  Vital Signs: T(F): 98.6 (19 @ 07:37), Max: 102.3 (19 @ 15:42)  HR: 88 (19 @ 13:15)  BP: 117/72 (19 @ 13:15)  RR: 16 (19 @ 07:37)  SpO2: 99% (19 @ 13:15)  Wt(kg): --  General: Nontoxic-appearing Female in no acute distress.  HEENT: AT/NC. PERRL. EOMI. Anicteric. Conjunctiva pink and moist. Oropharynx clear. Dentition fair.  Neck: Not rigid. No sense of mass.  Nodes: None palpable.  Lungs: Clear bilaterally without rales, wheezing or rhonchi  Heart: Regular rate and rhythm. No Murmur. No rub. No gallop. No palpable thrill.  Abdomen: Bowel sounds present and normoactive. Soft. Nondistended. Nontender. No sense of mass. No organomegaly.  Back: No spinal tenderness. No costovertebral angle tenderness.   Extremities: No cyanosis or clubbing. No edema. Right hip- decreased erythema , ?? contact dermatitis as serous drainage . Right knee- joint effusion and mild erythema   Skin: Warm. Dry. Good turgor. No rash. No vasculitic stigmata.  Psychiatric: Appropriate affect and mood for situation.         Laboratory Studies--  CBC                        9.2    9.15  )-----------( 433      ( 2019 09:28 )             28.5       Chemistries      138  |  108  |  7   ----------------------------<  155<H>  4.0   |  25  |  0.55    Ca    8.6      2019 09:28    Procalcitonin, Serum (19 @ 09:41)    Procalcitonin, Serum: 0.15:    Urinalysis Basic - ( 2019 09:27 )    Color: Yellow / Appearance: Clear / S.010 / pH: x  Gluc: x / Ketone: Negative  / Bili: Negative / Urobili: Negative   Blood: x / Protein: 25 mg/dL / Nitrite: Negative   Leuk Esterase: Negative / RBC: 3-5 /HPF / WBC Negative   Sq Epi: x / Non Sq Epi: Few / Bacteria: Occasional        RECENT CULTURES      RADIOLOGY:    US Duplex Venous Lower Ext Complete, Bilateral (19 @ 10:59) >  Comparison: None.    Findings: The bilateral common femoral vein, superficial femoral vein,   and popliteal vein demonstrate normal compressibility and flow on color   Doppler ultrasound. No intraluminal echogenic material is identified to   suggest thrombus.    The distal right femoral vein is not well seen.    There is edema in the upper right thigh/medial knee.    IMPRESSION: LIMITED STUDY. NO EVIDENCE OF DEEP VEIN THROMBOSIS IN THE    BILATERAL FEMORAL POPLITEAL VENOUS SYSTEM.        Assessment--   Xray Chest 1 View AP/PA (19 @ 09:05) >  Findings:  Lines: None    Heart/Mediastinum/Lungs: The heart size is normal. The lungs are   clear. There are no pleural effusions. Elevation of the right   hemidiaphragm.    Impression:    As above.      Assessment :     58 yo female hx of CIDP in IVIG, severe OA underwent a right THR by anterior approach on 19 , now has post op fever to 101. She has contusion of the right anterior thigh and possible allergic reaction to the dressing she had , she has allergies to adhesives . Clinically has no sign of infection . If the erythema does not improve may need abx of early cellulitis . Try to keep area dry     She has a reactive joint effusion right knee ?? infected   Plan :   - continue with IV Vancomycin pending cs    - send sed rate, crp , uric aid   - ortho follow up to check the right knee   - follow sepsis work up,  cs   - local wound care as per ortho , use paper tape   - trend cbc  -incentive spirometry     case discussed with Ortho team and Dr. Coto     Continue with present regime .  Appropriate use of antibiotics and adverse effects reviewed.      I have discussed the above plan of care with patient and family in detail. They expressed understanding of the treatment plan . Risks, benefits and alternatives discussed in detail. I have asked if they have any questions or concerns and appropriately addressed them to the best of my ability .      > 35 minutes spent in direct patient care reviewing  the notes, lab data/ imaging , discussion with multidisciplinary team. All questions were addressed and answered to the best of my capacity .    Thank you for allowing me to participate in the care of your patient .        Andriy Quezada MD  [phone]

## 2019-11-09 NOTE — PROGRESS NOTE ADULT - SUBJECTIVE AND OBJECTIVE BOX
Patient is a 59y old  Female who presents with a chief complaint of feels ok (2019 15:21)      INTERVAL /OVERNIGHT EVENTS: concerned about leg swelling    MEDICATIONS  (STANDING):  aspirin enteric coated 325 milliGRAM(s) Oral two times a day  ferrous    sulfate 325 milliGRAM(s) Oral daily  multivitamin 1 Tablet(s) Oral daily  pantoprazole    Tablet 40 milliGRAM(s) Oral before breakfast  polyethylene glycol 3350 17 Gram(s) Oral daily  simvastatin 20 milliGRAM(s) Oral at bedtime  vancomycin  IVPB 1000 milliGRAM(s) IV Intermittent every 12 hours    MEDICATIONS  (PRN):  acetaminophen   Tablet .. 650 milliGRAM(s) Oral every 4 hours PRN Temp greater or equal to 38C (100.4F)  aluminum hydroxide/magnesium hydroxide/simethicone Suspension 30 milliLiter(s) Oral four times a day PRN Indigestion  bisacodyl Suppository 10 milliGRAM(s) Rectal daily PRN If no bowel movement by POD#2  senna 2 Tablet(s) Oral at bedtime PRN Constipation  traMADol 25 milliGRAM(s) Oral every 6 hours PRN Moderate Pain (4 - 6)  traMADol 50 milliGRAM(s) Oral every 6 hours PRN Severe Pain (7 - 10)      Allergies    adhesives (Rash)  No Known Drug Allergies    Intolerances        REVIEW OF SYSTEMS:  CONSTITUTIONAL: No fever, weight loss, or fatigue  EYES: No eye pain, visual disturbances, or discharge  ENMT:  No difficulty hearing, tinnitus, vertigo; No sinus or throat pain  NECK: No pain or stiffness  RESPIRATORY: No cough, wheezing, chills or hemoptysis; No shortness of breath  CARDIOVASCULAR: No chest pain, palpitations, dizziness, or leg swelling  GASTROINTESTINAL: No abdominal or epigastric pain. No nausea, vomiting, or hematemesis; No diarrhea or constipation. No melena or hematochezia.  GENITOURINARY: No dysuria, frequency, hematuria, or incontinence  NEUROLOGICAL: No headaches, memory loss, loss of strength, numbness, or tremors  SKIN: No itching, burning, rashes, or lesions   LYMPH NODES: No enlarged glands  ENDOCRINE: No heat or cold intolerance; No hair loss; No polydipsia or polyuria  MUSCULOSKELETAL: + joint pain or swelling; No muscle, back, or extremity pain  PSYCHIATRIC: No depression, anxiety, mood swings, or difficulty sleeping  HEME/LYMPH: No easy bruising, or bleeding gums  ALLERGY AND IMMUNOLOGIC: No hives or eczema    Vital Signs Last 24 Hrs  T(C): 37 (2019 07:37), Max: 39.1 (2019 15:42)  T(F): 98.6 (2019 07:37), Max: 102.3 (2019 15:42)  HR: 88 (2019 13:15) (87 - 99)  BP: 117/72 (2019 13:15) (117/72 - 136/68)  BP(mean): --  RR: 16 (2019 07:37) (14 - 18)  SpO2: 99% (2019 13:15) (97% - 99%)    PHYSICAL EXAM:  GENERAL: NAD, well-groomed, well-developed  HEAD:  Atraumatic, Normocephalic  EYES: EOMI, PERRLA, conjunctiva and sclera clear  ENMT: No tonsillar erythema, exudates, or enlargement; Moist mucous membranes, Good dentition, No lesions  NECK: Supple, No JVD, Normal thyroid  NERVOUS SYSTEM:  Alert & Oriented X3, Good concentration; Motor Strength 5/5 B/L upper and lower extremities; DTRs 2+ intact and symmetric  CHEST/LUNG: Clear to auscultation bilaterally; No rales, rhonchi, wheezing, or rubs  HEART: Regular rate and rhythm; No murmurs, rubs, or gallops  ABDOMEN: Soft, Nontender, Nondistended; Bowel sounds present  EXTREMITIES:  2+ Peripheral Pulses, No clubbing, cyanosis, + edema  LYMPH: No lymphadenopathy noted  SKIN: No rashes or lesions    LABS:                        9.2    9.15  )-----------( 433      ( 2019 09:28 )             28.5     2019 09:28    138    |  108    |  7      ----------------------------<  155    4.0     |  25     |  0.55     Ca    8.6        2019 09:28        Urinalysis Basic - ( 2019 09:27 )    Color: Yellow / Appearance: Clear / S.010 / pH: x  Gluc: x / Ketone: Negative  / Bili: Negative / Urobili: Negative   Blood: x / Protein: 25 mg/dL / Nitrite: Negative   Leuk Esterase: Negative / RBC: 3-5 /HPF / WBC Negative   Sq Epi: x / Non Sq Epi: Few / Bacteria: Occasional      CAPILLARY BLOOD GLUCOSE          RADIOLOGY & ADDITIONAL TESTS:    Notes Reviewed:  [x ] YES  [ ] NO    Care Discussed with Consultants/Other Providers [x ] YES  [ ] NO

## 2019-11-09 NOTE — PROGRESS NOTE ADULT - SUBJECTIVE AND OBJECTIVE BOX
swelling noted to right knee yesterday, pt had been applying ice to knee, had been left on for extended periods today - noted to have some redness anterior knee this afternoon.  R knee with + swelling, slight erythema medial/anterior knee, localized, does not appear diffuse/streaking. area or redness actually cooler to touch than surrounding skin. Full pain free ROM, non-tender to palp about knee. no suspicion of infection in knee, rec cont observation. swelling more likely gravity/fluid from thigh tracking - would therfore not apply ice, as may confuse exam.   R hip with rash overlying area of previous adhesive dressing. no drainage from incision. comfortable ROM with expected recent post-op discomfort. exam c/w dermatitis, may cont with abx as previously recommended by ID as precaution for possible cellulitis

## 2019-11-10 VITALS — TEMPERATURE: 99 F

## 2019-11-10 LAB
-  AMPICILLIN: SIGNIFICANT CHANGE UP
-  CIPROFLOXACIN: SIGNIFICANT CHANGE UP
-  LEVOFLOXACIN: SIGNIFICANT CHANGE UP
-  NITROFURANTOIN: SIGNIFICANT CHANGE UP
-  TETRACYCLINE: SIGNIFICANT CHANGE UP
-  VANCOMYCIN: SIGNIFICANT CHANGE UP
ANION GAP SERPL CALC-SCNC: 4 MMOL/L — LOW (ref 5–17)
BASOPHILS # BLD AUTO: 0.05 K/UL — SIGNIFICANT CHANGE UP (ref 0–0.2)
BASOPHILS NFR BLD AUTO: 0.6 % — SIGNIFICANT CHANGE UP (ref 0–2)
BUN SERPL-MCNC: 8 MG/DL — SIGNIFICANT CHANGE UP (ref 7–23)
CALCIUM SERPL-MCNC: 8.8 MG/DL — SIGNIFICANT CHANGE UP (ref 8.5–10.1)
CHLORIDE SERPL-SCNC: 109 MMOL/L — HIGH (ref 96–108)
CO2 SERPL-SCNC: 29 MMOL/L — SIGNIFICANT CHANGE UP (ref 22–31)
CREAT SERPL-MCNC: 0.45 MG/DL — LOW (ref 0.5–1.3)
CULTURE RESULTS: SIGNIFICANT CHANGE UP
EOSINOPHIL # BLD AUTO: 0.5 K/UL — SIGNIFICANT CHANGE UP (ref 0–0.5)
EOSINOPHIL NFR BLD AUTO: 5.7 % — SIGNIFICANT CHANGE UP (ref 0–6)
GLUCOSE SERPL-MCNC: 93 MG/DL — SIGNIFICANT CHANGE UP (ref 70–99)
HCT VFR BLD CALC: 29.1 % — LOW (ref 34.5–45)
HGB BLD-MCNC: 9.3 G/DL — LOW (ref 11.5–15.5)
IMM GRANULOCYTES NFR BLD AUTO: 1.4 % — SIGNIFICANT CHANGE UP (ref 0–1.5)
LYMPHOCYTES # BLD AUTO: 1.8 K/UL — SIGNIFICANT CHANGE UP (ref 1–3.3)
LYMPHOCYTES # BLD AUTO: 20.5 % — SIGNIFICANT CHANGE UP (ref 13–44)
MCHC RBC-ENTMCNC: 28.3 PG — SIGNIFICANT CHANGE UP (ref 27–34)
MCHC RBC-ENTMCNC: 32 GM/DL — SIGNIFICANT CHANGE UP (ref 32–36)
MCV RBC AUTO: 88.4 FL — SIGNIFICANT CHANGE UP (ref 80–100)
METHOD TYPE: SIGNIFICANT CHANGE UP
MONOCYTES # BLD AUTO: 0.78 K/UL — SIGNIFICANT CHANGE UP (ref 0–0.9)
MONOCYTES NFR BLD AUTO: 8.9 % — SIGNIFICANT CHANGE UP (ref 2–14)
NEUTROPHILS # BLD AUTO: 5.53 K/UL — SIGNIFICANT CHANGE UP (ref 1.8–7.4)
NEUTROPHILS NFR BLD AUTO: 62.9 % — SIGNIFICANT CHANGE UP (ref 43–77)
NRBC # BLD: 0 /100 WBCS — SIGNIFICANT CHANGE UP (ref 0–0)
ORGANISM # SPEC MICROSCOPIC CNT: SIGNIFICANT CHANGE UP
ORGANISM # SPEC MICROSCOPIC CNT: SIGNIFICANT CHANGE UP
PLATELET # BLD AUTO: 469 K/UL — HIGH (ref 150–400)
POTASSIUM SERPL-MCNC: 5.1 MMOL/L — SIGNIFICANT CHANGE UP (ref 3.5–5.3)
POTASSIUM SERPL-SCNC: 5.1 MMOL/L — SIGNIFICANT CHANGE UP (ref 3.5–5.3)
RBC # BLD: 3.29 M/UL — LOW (ref 3.8–5.2)
RBC # FLD: 14.6 % — HIGH (ref 10.3–14.5)
SODIUM SERPL-SCNC: 142 MMOL/L — SIGNIFICANT CHANGE UP (ref 135–145)
SPECIMEN SOURCE: SIGNIFICANT CHANGE UP
VANCOMYCIN TROUGH SERPL-MCNC: 7.8 UG/ML — LOW (ref 10–20)
WBC # BLD: 8.78 K/UL — SIGNIFICANT CHANGE UP (ref 3.8–10.5)
WBC # FLD AUTO: 8.78 K/UL — SIGNIFICANT CHANGE UP (ref 3.8–10.5)

## 2019-11-10 PROCEDURE — 93970 EXTREMITY STUDY: CPT

## 2019-11-10 PROCEDURE — 85027 COMPLETE CBC AUTOMATED: CPT

## 2019-11-10 PROCEDURE — 97116 GAIT TRAINING THERAPY: CPT

## 2019-11-10 PROCEDURE — 85652 RBC SED RATE AUTOMATED: CPT

## 2019-11-10 PROCEDURE — 97166 OT EVAL MOD COMPLEX 45 MIN: CPT

## 2019-11-10 PROCEDURE — 88311 DECALCIFY TISSUE: CPT

## 2019-11-10 PROCEDURE — P9016: CPT

## 2019-11-10 PROCEDURE — 36415 COLL VENOUS BLD VENIPUNCTURE: CPT

## 2019-11-10 PROCEDURE — 36430 TRANSFUSION BLD/BLD COMPNT: CPT

## 2019-11-10 PROCEDURE — 87086 URINE CULTURE/COLONY COUNT: CPT

## 2019-11-10 PROCEDURE — 97162 PT EVAL MOD COMPLEX 30 MIN: CPT

## 2019-11-10 PROCEDURE — 85018 HEMOGLOBIN: CPT

## 2019-11-10 PROCEDURE — 85014 HEMATOCRIT: CPT

## 2019-11-10 PROCEDURE — 73562 X-RAY EXAM OF KNEE 3: CPT

## 2019-11-10 PROCEDURE — 97110 THERAPEUTIC EXERCISES: CPT

## 2019-11-10 PROCEDURE — 84145 PROCALCITONIN (PCT): CPT

## 2019-11-10 PROCEDURE — 88305 TISSUE EXAM BY PATHOLOGIST: CPT

## 2019-11-10 PROCEDURE — 82962 GLUCOSE BLOOD TEST: CPT

## 2019-11-10 PROCEDURE — 81001 URINALYSIS AUTO W/SCOPE: CPT

## 2019-11-10 PROCEDURE — 80202 ASSAY OF VANCOMYCIN: CPT

## 2019-11-10 PROCEDURE — C1776: CPT

## 2019-11-10 PROCEDURE — 84550 ASSAY OF BLOOD/URIC ACID: CPT

## 2019-11-10 PROCEDURE — 80048 BASIC METABOLIC PNL TOTAL CA: CPT

## 2019-11-10 PROCEDURE — 87186 SC STD MICRODIL/AGAR DIL: CPT

## 2019-11-10 PROCEDURE — 97535 SELF CARE MNGMENT TRAINING: CPT

## 2019-11-10 PROCEDURE — 97530 THERAPEUTIC ACTIVITIES: CPT

## 2019-11-10 PROCEDURE — 71045 X-RAY EXAM CHEST 1 VIEW: CPT

## 2019-11-10 PROCEDURE — 86140 C-REACTIVE PROTEIN: CPT

## 2019-11-10 PROCEDURE — 76000 FLUOROSCOPY <1 HR PHYS/QHP: CPT

## 2019-11-10 PROCEDURE — 87040 BLOOD CULTURE FOR BACTERIA: CPT

## 2019-11-10 RX ORDER — CLOTRIMAZOLE AND BETAMETHASONE DIPROPIONATE 10; .5 MG/G; MG/G
1 CREAM TOPICAL
Qty: 0 | Refills: 0 | DISCHARGE
Start: 2019-11-10

## 2019-11-10 RX ORDER — TRAMADOL HYDROCHLORIDE 50 MG/1
1 TABLET ORAL
Qty: 28 | Refills: 0
Start: 2019-11-10 | End: 2019-11-16

## 2019-11-10 RX ORDER — SENNA PLUS 8.6 MG/1
2 TABLET ORAL
Qty: 0 | Refills: 0 | DISCHARGE
Start: 2019-11-10

## 2019-11-10 RX ORDER — CLOTRIMAZOLE AND BETAMETHASONE DIPROPIONATE 10; .5 MG/G; MG/G
1 CREAM TOPICAL
Refills: 0 | Status: DISCONTINUED | OUTPATIENT
Start: 2019-11-10 | End: 2019-11-10

## 2019-11-10 RX ORDER — ASPIRIN/CALCIUM CARB/MAGNESIUM 324 MG
1 TABLET ORAL
Qty: 0 | Refills: 0 | DISCHARGE
Start: 2019-11-10

## 2019-11-10 RX ORDER — ACETAMINOPHEN 500 MG
2 TABLET ORAL
Qty: 0 | Refills: 0 | DISCHARGE
Start: 2019-11-10

## 2019-11-10 RX ADMIN — Medication 325 MILLIGRAM(S): at 05:44

## 2019-11-10 RX ADMIN — CLOTRIMAZOLE AND BETAMETHASONE DIPROPIONATE 1 APPLICATION(S): 10; .5 CREAM TOPICAL at 11:50

## 2019-11-10 RX ADMIN — Medication 1 TABLET(S): at 11:49

## 2019-11-10 RX ADMIN — Medication 325 MILLIGRAM(S): at 11:49

## 2019-11-10 RX ADMIN — Medication 325 MILLIGRAM(S): at 17:03

## 2019-11-10 RX ADMIN — PANTOPRAZOLE SODIUM 40 MILLIGRAM(S): 20 TABLET, DELAYED RELEASE ORAL at 05:44

## 2019-11-10 NOTE — PROGRESS NOTE ADULT - ASSESSMENT
59F s/p R YENI POD 5  Analgesia  Rash improving on right leg, completely resolved over knee,   DVT ppx with ASA  Incentive spirometry  WBAT  P/T  Discharge planning, likely home today if cleared by medicine

## 2019-11-10 NOTE — PROGRESS NOTE ADULT - ASSESSMENT
60 yo female hx of CIDP in IVIG, severe OA underwent a right THR by anterior approach on 11/5/19 , post op fever to 101, noted to have allergic reaction to the dressing she had , she has allergies to adhesives.   Rt knee swelling, no signs of infection, seen by Ortho, d/c planning today.

## 2019-11-10 NOTE — PROGRESS NOTE ADULT - PROBLEM/PLAN-4
DISPLAY PLAN FREE TEXT
fevers, headache s/p tooth extraction

## 2019-11-10 NOTE — PROGRESS NOTE ADULT - PROBLEM SELECTOR PLAN 1
hip replacement
hip replacement  POD - 2
hip replacement  POD - 3
hip replacement  POD - 4  ortho follow up
hip replacement  POD - 5   ortho follow up- d/c planning today

## 2019-11-10 NOTE — PROGRESS NOTE ADULT - PROBLEM SELECTOR PLAN 3
continue statin
IVF NS bolus  IVF for hydration  resolved / improved

## 2019-11-10 NOTE — PROGRESS NOTE ADULT - PROBLEM SELECTOR PLAN 2
ASA per ortho for DVT prevention
Post op fever, received antibiotics, ID following, urine cultures gram positive, likely contaminant, patient asymptomatic, cleared for discharge by ID.

## 2019-11-10 NOTE — PROGRESS NOTE ADULT - PROBLEM SELECTOR PLAN 4
IVF NS bolus
IVF NS bolus  IVF for hydration
IVF NS bolus  IVF for hydration  resolved
IVF NS bolus  IVF for hydration  resolved / improved
ASA per ortho for DVT prevention

## 2019-11-10 NOTE — PROGRESS NOTE ADULT - PROBLEM SELECTOR PROBLEM 1
Osteoarthritis of right hip

## 2019-11-10 NOTE — PROGRESS NOTE ADULT - REASON FOR ADMISSION
R Anterior YENI
R Anterior YENI
hip replacement
hip replacement
feels ok
hip replacement
hip replacement

## 2019-11-10 NOTE — PROGRESS NOTE ADULT - SUBJECTIVE AND OBJECTIVE BOX
Patient is a 59y old  Female who presents with a chief complaint of feels ok (08 Nov 2019 15:21)      INTERVAL /OVERNIGHT EVENTS:   Patient ambulated with PT today, no complaints.     T(C): 36.8 (11-10-19 @ 07:40), Max: 37.3 (11-10-19 @ 05:45)  HR: 83 (11-10-19 @ 07:40) (83 - 83)  BP: 120/70 (11-10-19 @ 07:40) (120/70 - 120/70)  RR: 16 (11-10-19 @ 07:40) (16 - 16)  SpO2: 96% (11-10-19 @ 07:40) (96% - 96%)      MEDICATIONS  (STANDING):  aspirin enteric coated 325 milliGRAM(s) Oral two times a day  clotrimazole/betamethasone Cream 1 Application(s) Topical two times a day  ferrous    sulfate 325 milliGRAM(s) Oral daily  multivitamin 1 Tablet(s) Oral daily  pantoprazole    Tablet 40 milliGRAM(s) Oral before breakfast  polyethylene glycol 3350 17 Gram(s) Oral daily  simvastatin 20 milliGRAM(s) Oral at bedtime    MEDICATIONS  (PRN):  acetaminophen   Tablet .. 650 milliGRAM(s) Oral every 4 hours PRN Temp greater or equal to 38C (100.4F)  aluminum hydroxide/magnesium hydroxide/simethicone Suspension 30 milliLiter(s) Oral four times a day PRN Indigestion  bisacodyl Suppository 10 milliGRAM(s) Rectal daily PRN If no bowel movement by POD#2  senna 2 Tablet(s) Oral at bedtime PRN Constipation  traMADol 25 milliGRAM(s) Oral every 6 hours PRN Moderate Pain (4 - 6)  traMADol 50 milliGRAM(s) Oral every 6 hours PRN Severe Pain (7 - 10)    Allergies    adhesives (Rash)  No Known Drug Allergies    Intolerances    ROS other wise negative      PHYSICAL EXAM:  GENERAL: NAD, well-groomed, well-developed  HEAD:  Atraumatic, Normocephalic  EYES: EOMI, conjunctiva and sclera clear  ENMT: No tonsillar erythema, exudates, or enlargement; Moist mucous membranes, Good dentition, No lesions  NECK: Supple, No JVD, Normal thyroid  NERVOUS SYSTEM:  Alert & Oriented X3, Good concentration; Motor Strength 5/5 B/L upper and lower extremities; DTRs 2+ intact and symmetric  CHEST/LUNG: Clear to auscultation bilaterally; No rales, rhonchi, wheezing, or rubs  HEART: Regular rate and rhythm; No murmurs, rubs, or gallops  ABDOMEN: Soft, Nontender, Nondistended; Bowel sounds present  EXTREMITIES:  Rt knee swollen, Rash anterior Rt thigh   2+ Peripheral Pulses, No clubbing, cyanosis, + edema  LYMPH: No lymphadenopathy noted  SKIN: No rashes or lesions                            9.3    8.78  )-----------( 469      ( 10 Nov 2019 07:03 )             29.1               142|109|8<93  5.1|29|0.45  8.8,--,--  11-10 @ 07:03    CAPILLARY BLOOD GLUCOSE        CAPILLARY BLOOD GLUCOSE          RADIOLOGY & ADDITIONAL TESTS:    Notes Reviewed:  [x ] YES  [ ] NO    Care Discussed with Consultants/Other Providers [x ] YES  [ ] NO

## 2019-11-10 NOTE — PROGRESS NOTE ADULT - SUBJECTIVE AND OBJECTIVE BOX
Pt S/E at bedside, no acute events overnight, pain controlled, reports her rash has improved, denies SOB/CP/N/V.     Vital Signs Last 24 Hrs  T(C): 36.8 (10 Nov 2019 07:40), Max: 37.8 (09 Nov 2019 20:41)  T(F): 98.2 (10 Nov 2019 07:40), Max: 100 (09 Nov 2019 20:41)  HR: 83 (10 Nov 2019 07:40) (83 - 96)  BP: 120/70 (10 Nov 2019 07:40) (110/60 - 128/72)  BP(mean): --  RR: 16 (10 Nov 2019 07:40) (16 - 18)  SpO2: 96% (10 Nov 2019 07:40) (95% - 99%)    Gen: NAD,    Right Lower Extremity:  Incision well appearing with no active drainage or bleeding no areas of fluctuance, improving raised erythema immediately around the incision site in an exact pattern of the previous acquacell dressing which blanches with pressure  +EHL/FHL/TA/GS  SILT L3-S1  +DP/PT Pulses  Compartments soft  No calf TTP B/L

## 2019-11-10 NOTE — PROGRESS NOTE ADULT - SUBJECTIVE AND OBJECTIVE BOX
ID progress note     Name: GRACIE CASH  Age: 59y  Gender: Female  MRN: [id number]    Interval History-- Events noted, doing well, orthopedic follow up noted, all adhesive dressing removed, she has a contact dermatitis rash from dressing, also has rash extends to perineum . NO Gu symptoms   Notes reviewed    Past Medical History--  Unilateral primary osteoarthritis, right hip  Demyelinating neuropathy  H/O: rheumatic fever  Thyroid cyst  Osteoarthritis, unspecified osteoarthritis type, unspecified site  Anemia  CIDP (chronic inflammatory demyelinating polyneuropathy)  Hyperlipidemia  Status post total right knee replacement  S/P hip replacement  S/P knee surgery  S/P carpal tunnel release      For details regarding the patient's social history, family history, and other miscellaneous elements, please refer the initial infectious diseases consultation and/or the admitting history and physical examination for this admission.    Allergies--  Allergies    adhesives (Rash)  No Known Drug Allergies    Intolerances        Medications--  Antibiotics:  vancomycin  IVPB 1000 milliGRAM(s) IV Intermittent every 12 hours    Immunologic:    Other:  acetaminophen   Tablet .. PRN  aluminum hydroxide/magnesium hydroxide/simethicone Suspension PRN  aspirin enteric coated  bisacodyl Suppository PRN  ferrous    sulfate  multivitamin  pantoprazole    Tablet  polyethylene glycol 3350  senna PRN  simvastatin  traMADol PRN  traMADol PRN      Review of Systems--  A 10-point review of systems was obtained.     Pertinent positives and negatives--  Constitutional: No fevers. No Chills. No Rigors.   Cardiovascular: No chest pain. No palpitations.  Respiratory: No shortness of breath. No cough.  Gastrointestinal: No nausea or vomiting. No diarrhea or constipation.   Psychiatric: Pleasant. Appropriate affect.    Review of systems otherwise negative except as previously noted.    Physical Examination--  Vital Signs: T(F): 98.2 (11-10-19 @ 07:40), Max: 100 (11-09-19 @ 20:41)  HR: 83 (11-10-19 @ 07:40)  BP: 120/70 (11-10-19 @ 07:40)  RR: 16 (11-10-19 @ 07:40)  SpO2: 96% (11-10-19 @ 07:40)  Wt(kg): --  General: Nontoxic-appearing Female in no acute distress.  HEENT: AT/NC. PERRL. EOMI. Anicteric. Conjunctiva pink and moist. Oropharynx clear. Dentition fair.  Neck: Not rigid. No sense of mass.  Nodes: None palpable.  Lungs: Clear bilaterally without rales, wheezing or rhonchi  Heart: Regular rate and rhythm. No Murmur. No rub. No gallop. No palpable thrill.  Abdomen: Bowel sounds present and normoactive. Soft. Nondistended. Nontender. No sense of mass. No organomegaly.  Back: No spinal tenderness. No costovertebral angle tenderness.   Extremities: No cyanosis or clubbing. No edema. right hip- Incision well appearing with no active drainage or bleeding no areas of fluctuance, improving raised erythema immediately around the incision site in an exact pattern of the previous acquacell dressing which blanches with pressure, no drainage from staple line   Skin: Warm. Dry. Good turgor. No rash. No vasculitic stigmata.  Psychiatric: Appropriate affect and mood for situation.         Laboratory Studies--  CBC                        9.3    8.78  )-----------( 469      ( 10 Nov 2019 07:03 )             29.1       Chemistries  11-10    142  |  109<H>  |  8   ----------------------------<  93  5.1   |  29  |  0.45<L>    Ca    8.8      10 Nov 2019 07:03    Procalcitonin, Serum (11.08.19 @ 09:41)    Procalcitonin, Serum: 0.15:     Sedimentation Rate, Erythrocyte (11.09.19 @ 16:05)    Sedimentation Rate, Erythrocyte: 111 mm/hr    C-Reactive Protein, Serum (11.09.19 @ 21:10)    C-Reactive Protein, Serum: 20.63 mg/dL    Uric Acid, Serum (11.09.19 @ 16:05)    Uric Acid, Serum: 3.6 mg/dL        Culture Data    Culture - Urine (collected 08 Nov 2019 17:08)  Source: .Urine Clean Catch (Midstream)  Preliminary Report (09 Nov 2019 21:35):    50,000 - 99,000 CFU/mL Gram positive organisms    Culture - Blood (collected 08 Nov 2019 17:06)  Source: .Blood Blood  Preliminary Report (09 Nov 2019 18:01):    No growth to date.    Culture - Blood (collected 08 Nov 2019 17:06)  Source: .Blood Blood  Preliminary Report (09 Nov 2019 18:01):    No growth to date.      RADIOLOGY:  US Duplex Venous Lower Ext Complete, Bilateral (11.09.19 @ 10:59) >  Comparison: None.    Findings: The bilateral common femoral vein, superficial femoral vein,   and popliteal vein demonstrate normal compressibility and flow on color   Doppler ultrasound. No intraluminal echogenic material is identified to   suggest thrombus.    The distal right femoral vein is not well seen.    There is edema in the upper right thigh/medial knee.    IMPRESSION: LIMITED STUDY. NO EVIDENCE OF DEEP VEIN THROMBOSIS IN THE    BILATERAL FEMORAL POPLITEAL VENOUS SYSTEM.        Assessment--   Xray Chest 1 View AP/PA (11.08.19 @ 09:05) >  Findings:  Lines: None    Heart/Mediastinum/Lungs: The heart size is normal. The lungs are   clear. There are no pleural effusions. Elevation of the right   hemidiaphragm.    Impression:    As above.      Assessment :     58 yo female hx of CIDP in IVIG, severe OA underwent a right THR by anterior approach on 11/5/19 , now has post op fever to 101. She has contusion of the right anterior thigh and possible allergic reaction to the dressing she had , she has allergies to adhesives . Clinically has no sign of infection . If the erythema does not improve may need abx of early cellulitis . Try to keep area dry     She has a reactive joint effusion right knee, looks more like reactive effusion     Doubt she has a UTI   Plan :   - will dc Vancomycin and observe   - consider adding lotrisone cream topically over the rash bid   - ortho follow up to check the right knee   - local wound care as per ortho , wound left open to air   - trend cbc  -incentive spirometry     case discussed with Ortho team and Dr. Coto     Continue with present regime .  Appropriate use of antibiotics and adverse effects reviewed.    I have discussed the above plan of care with patient/family in detail. They expressed understanding of the treatment plan . Risks, benefits and alternatives discussed in detail. I have asked if they have any questions or concerns and appropriately addressed them to the best of my ability .      > 35 minutes spent in direct patient care reviewing  the notes, lab data/ imaging , discussion with multidisciplinary team. All questions were addressed and answered to the best of my capacity .    Thank you for allowing me to participate in the care of your patient .        Andriy Quezada MD  [phone]

## 2019-11-13 LAB
CULTURE RESULTS: SIGNIFICANT CHANGE UP
CULTURE RESULTS: SIGNIFICANT CHANGE UP
SPECIMEN SOURCE: SIGNIFICANT CHANGE UP
SPECIMEN SOURCE: SIGNIFICANT CHANGE UP

## 2020-06-12 NOTE — H&P PST ADULT -  CURRENT SWALLOWING
Cryotherapy Text: The wound bed was treated with cryotherapy after the biopsy was performed. (0) swallows foods and liquids w/o difficulty

## 2020-07-28 NOTE — DISCHARGE NOTE NURSING/CASE MANAGEMENT/SOCIAL WORK - PATIENT PORTAL LINK FT
You can access the FollowMyHealth Patient Portal offered by [facility] by registering at the following website: [email or web address]. By joining [employer]’s FollowMyHealth portal, you will also be able to view your health information using other applications (apps) compatible with our system.
Patient/Caregiver provided printed discharge information.

## 2020-10-05 NOTE — DISCHARGE NOTE PROVIDER - NSDCQMSTAIRS_GEN_ALL_CORE
How Severe Is This Condition?: moderate Additional History: The patient reports some of these lesions are bothersome when shaving. Yes

## 2020-12-31 NOTE — PATIENT PROFILE ADULT - NSPROGENANESREACTION_GEN_A_NUR

## 2021-01-28 PROBLEM — M16.11 UNILATERAL PRIMARY OSTEOARTHRITIS, RIGHT HIP: Chronic | Status: ACTIVE | Noted: 2019-10-22

## 2021-01-28 PROBLEM — G62.9 POLYNEUROPATHY, UNSPECIFIED: Chronic | Status: ACTIVE | Noted: 2019-10-22

## 2021-01-28 PROBLEM — Z86.79 PERSONAL HISTORY OF OTHER DISEASES OF THE CIRCULATORY SYSTEM: Chronic | Status: ACTIVE | Noted: 2019-10-22

## 2021-02-18 ENCOUNTER — APPOINTMENT (OUTPATIENT)
Dept: ORTHOPEDIC SURGERY | Facility: CLINIC | Age: 61
End: 2021-02-18

## 2021-02-24 ENCOUNTER — APPOINTMENT (OUTPATIENT)
Dept: ORTHOPEDIC SURGERY | Facility: CLINIC | Age: 61
End: 2021-02-24
Payer: COMMERCIAL

## 2021-02-24 PROCEDURE — 99072 ADDL SUPL MATRL&STAF TM PHE: CPT

## 2021-02-24 PROCEDURE — 99204 OFFICE O/P NEW MOD 45 MIN: CPT

## 2021-02-24 PROCEDURE — 73630 X-RAY EXAM OF FOOT: CPT | Mod: RT

## 2021-02-24 PROCEDURE — 73610 X-RAY EXAM OF ANKLE: CPT | Mod: RT

## 2021-02-24 NOTE — PHYSICAL EXAM
[de-identified] : Right ankle Physical Examination:\par \par General: Alert and oriented x3.  In no acute distress.  Pleasant in nature with a normal affect.  No apparent respiratory distress. \par Erythema, Warmth, Rubor: Negative\par Swelling: Negative\par \par Pes cavus w/ varus hindfoot\par \par ROM:\par 1. Dorsiflexion: 0 degrees\par 2. Plantarflexion: 40 degrees\par 3. Inversion: 10 degrees\par 4. Eversion: 10 degrees\par \par *Severe ankle weakness present.  2 out of 5 strength, dorsi flexion.\par \par Tenderness to Palpation: \par 1. Lateral Malleolus: Negative\par 2. Medial Malleolus: Negative\par 3. Proximal Fibular Pain: Negative\par 4. Heel Pain: Negative\par 5. Cuboid: Negative\par 6. Navicular: Negative\par 7. Tibiotalar Joint: Negative\par 8. Subtalar Joint: Negative\par 9. Posterior Recess: Negative\par \par Tendon Pain:\par 1. Achilles: Negative\par 2. Peroneals: Negative\par 3. Posterior Tibialis: Negative\par 4. Tibialis Anterior: Negative\par \par Ligament Pain:\par 1. ATFL: Negative\par 2. CFL: Negative \par 3. PTFL: Negative\par 4. Deltoid Ligaments: Negative\par 5. Lis Franc Ligament: Negative\par \par Stability: \par 1. Anterior Drawer: Negative\par 2. Posterior Drawer: Negative\par \par Strength: 5/5 TA/GS/EHL\par \par Pulses: 2+ DP/PT Pulses\par \par Neuro: Intact motor and sensory but she does have decreased sensation in the right foot.\par \par Additional Test:\par 1. Calcaneal Squeeze Test: Negative\par 2. Syndesmosis Squeeze Test: Negative\par \par *The patient walks with a high step gait right foot.\par \par  [de-identified] : X-rays of the right ankle and foot: Pes cavus noted.  No fractures identified.

## 2021-02-24 NOTE — REASON FOR VISIT
[Initial Visit] : an initial visit for [FreeTextEntry2] : Right ankle/right foot weakness due to neurological issues

## 2021-02-24 NOTE — DISCUSSION/SUMMARY
[de-identified] : 1.  Custom AFO brace prescription given.\par 2.  Continue with strengthening exercises for the ankle and foot.\par 3.  Walking assistance using her cane.\par 4.  She is to follow-up with her neurologist for the neurovascular issues.\par 5.  The patient can follow-up here as needed.  All of her questions were answered.\par \par ****Patient has a fall while returning from Xray. Secondary PE did not reveal any additional areas of pain. patient advised this has happened to her before. She denies headstrike or LOC. Likely attributing this fall to her weakness and foot drop.

## 2021-02-24 NOTE — HISTORY OF PRESENT ILLNESS
[FreeTextEntry1] : Alma Rosa is a 60-year-old female who suffers from neurological issues in her right lower extremity.  This causes weakness of the right ankle and right foot.  She uses a cane and walks with a high-stepping gait because of the weakness in her right ankle causing a foot drop presentation.  She has minimal pain in the ankle and foot.  She was referred by her neurologist to get a possible brace for her ankle.  She has no other issues.\par \par Patient is managed by a neurologist for chronic peripheral demyelinating neuropathy. She receives infusion treatments for this issue. She has been diagnosed with a right foot drop in the past.\par

## 2021-05-24 ENCOUNTER — APPOINTMENT (OUTPATIENT)
Dept: ORTHOPEDIC SURGERY | Facility: CLINIC | Age: 61
End: 2021-05-24
Payer: COMMERCIAL

## 2021-05-24 ENCOUNTER — NON-APPOINTMENT (OUTPATIENT)
Age: 61
End: 2021-05-24

## 2021-05-24 DIAGNOSIS — Z78.9 OTHER SPECIFIED HEALTH STATUS: ICD-10-CM

## 2021-05-24 DIAGNOSIS — Z87.39 PERSONAL HISTORY OF OTHER DISEASES OF THE MUSCULOSKELETAL SYSTEM AND CONNECTIVE TISSUE: ICD-10-CM

## 2021-05-24 DIAGNOSIS — Z82.61 FAMILY HISTORY OF ARTHRITIS: ICD-10-CM

## 2021-05-24 DIAGNOSIS — Z80.9 FAMILY HISTORY OF MALIGNANT NEOPLASM, UNSPECIFIED: ICD-10-CM

## 2021-05-24 DIAGNOSIS — Z86.69 PERSONAL HISTORY OF OTHER DISEASES OF THE NERVOUS SYSTEM AND SENSE ORGANS: ICD-10-CM

## 2021-05-24 DIAGNOSIS — Z72.3 LACK OF PHYSICAL EXERCISE: ICD-10-CM

## 2021-05-24 DIAGNOSIS — Z86.39 PERSONAL HISTORY OF OTHER ENDOCRINE, NUTRITIONAL AND METABOLIC DISEASE: ICD-10-CM

## 2021-05-24 PROCEDURE — 99213 OFFICE O/P EST LOW 20 MIN: CPT

## 2021-05-24 PROCEDURE — 99072 ADDL SUPL MATRL&STAF TM PHE: CPT

## 2021-05-24 NOTE — HISTORY OF PRESENT ILLNESS
[FreeTextEntry1] : 5/24/2021: ALMA ROSA CASH is a 60 year old female who presents for follow-up evaluation of right foot weakness.  The patient is in PT and works on gait training.  She is having trouble walking without assistance.  Her pain scale 0/10.  No other complaints.  Using brace and cane for ambulation.\par \par 2/24/2021: Alma Rosa is a 60-year-old female who suffers from neurological issues in her right lower extremity.  This causes weakness of the right ankle and right foot.  She uses a cane and walks with a high-stepping gait because of the weakness in her right ankle causing a foot drop presentation.  She has minimal pain in the ankle and foot.  She was referred by her neurologist to get a possible brace for her ankle.  She has no other issues.\par \par Patient is managed by a neurologist for chronic peripheral demyelinating neuropathy. She receives infusion treatments for this issue. She has been diagnosed with a right foot drop in the past.\par

## 2021-05-24 NOTE — DISCUSSION/SUMMARY
[de-identified] : Today I had a lengthy discussion with the patient regarding their right foot drop. I have addressed all the patient's concerns surrounding the pathology of their condition. I recommend we continue with a nonoperative approach.\par \par Patient had her custom brace examined in the office. I advised her to continue with home exercises and therapy. \par \par I would like to see the patient back in the office in 2-3 months to reassess their condition. The patient understood and verbally agreed to the treatment plan. All of their questions were answered and they were satisfied with the visit. The patient should call the office if they have any questions or experience worsening symptoms.

## 2021-05-24 NOTE — ADDENDUM
[FreeTextEntry1] : I, Venkata Francis, acted solely as a scribe for Dr. Franklin Thornton on this date 05/24/2021  .\par  \par All medical record entries made by the Scribe were at my, Dr. Franklin Thornton, direction and personally dictated by me on 05/24/2021 . I have reviewed the chart and agree that the record accurately reflects my personal performance of the history, physical exam, assessment and plan. I have also personally directed, reviewed, and agreed with the chart.

## 2021-05-24 NOTE — PHYSICAL EXAM
[de-identified] : Right ankle Physical Examination:\par \par General: Alert and oriented x3.  In no acute distress.  Pleasant in nature with a normal affect.  No apparent respiratory distress. \par Erythema, Warmth, Rubor: Negative\par Swelling: Negative\par \par Pes cavus w/ varus hindfoot\par \par ROM:\par 1. Dorsiflexion: 0 degrees\par 2. Plantarflexion: 40 degrees\par 3. Inversion: 10 degrees\par 4. Eversion: 10 degrees\par \par *Severe ankle weakness present.  2 out of 5 strength, dorsi flexion.\par \par Tenderness to Palpation: \par 1. Lateral Malleolus: Negative\par 2. Medial Malleolus: Negative\par 3. Proximal Fibular Pain: Negative\par 4. Heel Pain: Negative\par 5. Cuboid: Negative\par 6. Navicular: Negative\par 7. Tibiotalar Joint: Negative\par 8. Subtalar Joint: Negative\par 9. Posterior Recess: Negative\par \par Tendon Pain:\par 1. Achilles: Negative\par 2. Peroneals: Negative\par 3. Posterior Tibialis: Negative\par 4. Tibialis Anterior: Negative\par \par Ligament Pain:\par 1. ATFL: Negative\par 2. CFL: Negative \par 3. PTFL: Negative\par 4. Deltoid Ligaments: Negative\par 5. Lis Franc Ligament: Negative\par \par Stability: \par 1. Anterior Drawer: Negative\par 2. Posterior Drawer: Negative\par \par Strength: 5/5 TA/GS/EHL\par \par Pulses: 2+ DP/PT Pulses\par \par Neuro: Intact motor and sensory but she does have decreased sensation in the right foot.\par \par Additional Test:\par 1. Calcaneal Squeeze Test: Negative\par 2. Syndesmosis Squeeze Test: Negative\par \par *The patient walks with a high step gait right foot. [de-identified] : X-rays of the right ankle and foot: Pes cavus noted.  No fractures identified.

## 2021-07-26 ENCOUNTER — APPOINTMENT (OUTPATIENT)
Dept: ORTHOPEDIC SURGERY | Facility: CLINIC | Age: 61
End: 2021-07-26
Payer: COMMERCIAL

## 2021-07-26 PROCEDURE — 99072 ADDL SUPL MATRL&STAF TM PHE: CPT

## 2021-07-26 PROCEDURE — 99213 OFFICE O/P EST LOW 20 MIN: CPT

## 2021-07-26 NOTE — HISTORY OF PRESENT ILLNESS
[FreeTextEntry1] : 7/26/21: The patieht is here for a follow-up of her right foot weakness and foot drop.  She states that the brace us currently being made by [employer].  Possible KAFO brace. \par \par 5/24/2021: ALMA ROSA CASH is a 60 year old female who presents for follow-up evaluation of right foot weakness.  The patient is in PT and works on gait training.  She is having trouble walking without assistance.  Her pain scale 0/10.  No other complaints.  Using brace and cane for ambulation.\par \par 2/24/2021: Alma Rosa is a 60-year-old female who suffers from neurological issues in her right lower extremity.  This causes weakness of the right ankle and right foot.  She uses a cane and walks with a high-stepping gait because of the weakness in her right ankle causing a foot drop presentation.  She has minimal pain in the ankle and foot.  She was referred by her neurologist to get a possible brace for her ankle.  She has no other issues.\par \par Patient is managed by a neurologist for chronic peripheral demyelinating neuropathy. She receives infusion treatments for this issue. She has been diagnosed with a right foot drop in the past.\par

## 2021-07-26 NOTE — PHYSICAL EXAM
[de-identified] : Right ankle Physical Examination:\par \par General: Alert and oriented x3.  In no acute distress.  Pleasant in nature with a normal affect.  No apparent respiratory distress. \par Erythema, Warmth, Rubor: Negative\par Swelling: Negative\par \par Pes cavus w/ varus hindfoot\par \par ROM:\par 1. Dorsiflexion: 0 degrees\par 2. Plantarflexion: 40 degrees\par 3. Inversion: 10 degrees\par 4. Eversion: 10 degrees\par \par *Severe ankle weakness present.  2 out of 5 strength, dorsi flexion.\par \par Tenderness to Palpation: \par 1. Lateral Malleolus: Negative\par 2. Medial Malleolus: Negative\par 3. Proximal Fibular Pain: Negative\par 4. Heel Pain: Negative\par 5. Cuboid: Negative\par 6. Navicular: Negative\par 7. Tibiotalar Joint: Negative\par 8. Subtalar Joint: Negative\par 9. Posterior Recess: Negative\par \par Tendon Pain:\par 1. Achilles: Negative\par 2. Peroneals: Negative\par 3. Posterior Tibialis: Negative\par 4. Tibialis Anterior: Negative\par \par Ligament Pain:\par 1. ATFL: Negative\par 2. CFL: Negative \par 3. PTFL: Negative\par 4. Deltoid Ligaments: Negative\par 5. Lis Franc Ligament: Negative\par \par Stability: \par 1. Anterior Drawer: Negative\par 2. Posterior Drawer: Negative\par \par Strength: 5/5 TA/GS/EHL\par \par Pulses: 2+ DP/PT Pulses\par \par Neuro: Intact motor and sensory but she does have decreased sensation in the right foot.\par \par Additional Test:\par 1. Calcaneal Squeeze Test: Negative\par 2. Syndesmosis Squeeze Test: Negative\par \par *The patient walks with a high step gait right foot.  Patient in recurvatum right knee.  [de-identified] : X-rays of the right ankle and foot: Pes cavus noted.  No fractures identified.

## 2021-07-26 NOTE — DISCUSSION/SUMMARY
[de-identified] : Patient to get custom made KAFO vs knee brace.  She is speaking with her orthotist and PT about the best brace for her.  She will continue with PT for ankle strength and leg strength.  She can follow-up as needed.

## 2021-10-04 ENCOUNTER — APPOINTMENT (OUTPATIENT)
Dept: ORTHOPEDIC SURGERY | Facility: CLINIC | Age: 61
End: 2021-10-04
Payer: COMMERCIAL

## 2021-10-04 PROCEDURE — 99213 OFFICE O/P EST LOW 20 MIN: CPT

## 2021-10-04 RX ORDER — SIMVASTATIN 20 MG/1
20 TABLET, FILM COATED ORAL
Qty: 90 | Refills: 0 | Status: ACTIVE | COMMUNITY
Start: 2021-08-17

## 2021-10-04 RX ORDER — CEPHALEXIN 500 MG/1
500 CAPSULE ORAL
Qty: 40 | Refills: 0 | Status: ACTIVE | COMMUNITY
Start: 2021-07-29

## 2021-10-04 RX ORDER — METOPROLOL SUCCINATE 50 MG/1
50 TABLET, EXTENDED RELEASE ORAL
Qty: 90 | Refills: 0 | Status: ACTIVE | COMMUNITY
Start: 2021-09-30

## 2021-10-04 RX ORDER — METOPROLOL SUCCINATE 25 MG/1
25 TABLET, EXTENDED RELEASE ORAL
Qty: 90 | Refills: 0 | Status: ACTIVE | COMMUNITY
Start: 2021-08-23

## 2021-10-04 RX ORDER — AMOXICILLIN 500 MG/1
500 CAPSULE ORAL
Qty: 30 | Refills: 0 | Status: ACTIVE | COMMUNITY
Start: 2021-06-29

## 2021-10-04 RX ORDER — IMMUNE GLOBULIN (HUMAN) 10 G/100ML
20 INJECTION INTRAVENOUS; SUBCUTANEOUS
Qty: 1600 | Refills: 0 | Status: ACTIVE | COMMUNITY
Start: 2021-09-23

## 2021-10-04 NOTE — ADDENDUM
[FreeTextEntry1] : I, Elizabeth Gandara, acted solely as a scribe for Dr. Franklin Thornton on this date 10/04/2021.\par \par All medical record entries made by the Scribe were at my, Dr. Franklin Thornton, direction and personally dictated by me on 10/04/2021 . I have reviewed the chart and agree that the record accurately reflects my personal performance of the history, physical exam, assessment and plan. I have also personally directed, reviewed, and agreed with the chart.	\par

## 2021-10-04 NOTE — HISTORY OF PRESENT ILLNESS
[FreeTextEntry1] : 10/4/2021: ALMA ROSA CASH is a 61 year old female presenting for a follow-up evaluation of right foot weakness and drop. The patient’s pain is noted to be worsened since her last evaluation, noting that her heel pain has increased. She is concerned with instability with walking and weightbearing on the foot. The patient has been attending physical therapy for this issue, but has not attended in the past 3 weeks. The patient presents ambulating with a cane. She is wearing sneakers with an ASO brace. The patient is walking with a limp at the clinic.  		\par 	\par 7/26/21: The patieht is here for a follow-up of her right foot weakness and foot drop.  She states that the brace is currently being made by [employer].  Possible KAFO brace. \par \par 5/24/2021: ALMA ROSA CASH is a 60 year old female who presents for follow-up evaluation of right foot weakness.  The patient is in PT and works on gait training.  She is having trouble walking without assistance.  Her pain scale 0/10.  No other complaints.  Using brace and cane for ambulation.\par \par 2/24/2021: Alma Rosa is a 60-year-old female who suffers from neurological issues in her right lower extremity.  This causes weakness of the right ankle and right foot.  She uses a cane and walks with a high-stepping gait because of the weakness in her right ankle causing a foot drop presentation.  She has minimal pain in the ankle and foot.  She was referred by her neurologist to get a possible brace for her ankle.  She has no other issues.\par \par Patient is managed by a neurologist for chronic peripheral demyelinating neuropathy. She receives infusion treatments for this issue. She has been diagnosed with a right foot drop in the past.\par

## 2021-10-04 NOTE — PHYSICAL EXAM
[de-identified] : Right ankle Physical Examination:\par \par General: Alert and oriented x3.  In no acute distress.  Pleasant in nature with a normal affect.  No apparent respiratory distress. \par Erythema, Warmth, Rubor: Negative\par Swelling: Negative\par \par Pes cavus w/ varus hindfoot\par \par ROM:\par 1. Dorsiflexion: 0 degrees\par 2. Plantarflexion: 40 degrees\par 3. Inversion: 10 degrees\par 4. Eversion: 10 degrees\par \par *Severe ankle weakness present.  2 out of 5 strength, dorsi flexion.\par \par Tenderness to Palpation: \par 1. Lateral Malleolus: Negative\par 2. Medial Malleolus: Negative\par 3. Proximal Fibular Pain: Negative\par 4. Heel Pain: Negative\par 5. Cuboid: Negative\par 6. Navicular: Negative\par 7. Tibiotalar Joint: Negative\par 8. Subtalar Joint: Negative\par 9. Posterior Recess: Negative\par \par Tendon Pain:\par 1. Achilles: Negative\par 2. Peroneals: Negative\par 3. Posterior Tibialis: Negative\par 4. Tibialis Anterior: Negative\par \par Ligament Pain:\par 1. ATFL: Negative\par 2. CFL: Negative \par 3. PTFL: Negative\par 4. Deltoid Ligaments: Negative\par 5. Lis Franc Ligament: Negative\par \par Stability: \par 1. Anterior Drawer: Negative\par 2. Posterior Drawer: Negative\par \par Strength: 5/5 TA/GS/EHL\par \par Pulses: 2+ DP/PT Pulses\par \par Neuro: Intact motor and sensory but she does have decreased sensation in the right foot.\par \par Additional Test:\par 1. Calcaneal Squeeze Test: Negative\par 2. Syndesmosis Squeeze Test: Negative\par \par *The patient walks with a high step gait right foot.  Patient in recurvatum right knee.  [de-identified] : No new imaging was obtained today. \par

## 2021-10-04 NOTE — DISCUSSION/SUMMARY
[de-identified] : Today I had a lengthy discussion with the patient regarding their right foot pain. I have addressed all the patient's concerns surrounding the pathology of their condition. I recommend the patient undergo a course of physical therapy for the right foot  2-3 times a week for a total of 6-8 weeks. A prescription was given for the physical therapy today.\par \par I recommended that the patient continue utilize an KAFO brace with a lateral ramp. The patient was educated about bracing options in the office today. 	\par \par The patient understood and verbally agreed to the treatment plan. All of their questions were answered and they were satisfied with the visit. The patient should call the office if they have any questions or experience worsening symptoms. I would like to see the patient back in the office in 3-4 months to reassess their condition. 				\par

## 2022-01-14 ENCOUNTER — APPOINTMENT (OUTPATIENT)
Dept: ORTHOPEDIC SURGERY | Facility: CLINIC | Age: 62
End: 2022-01-14
Payer: COMMERCIAL

## 2022-01-14 PROCEDURE — 99214 OFFICE O/P EST MOD 30 MIN: CPT

## 2022-01-14 NOTE — HISTORY OF PRESENT ILLNESS
[FreeTextEntry1] : 1/14/2021: The patient is a 61 year old female with a MHx of Neuropathy presenting for a follow-up evaluation of right foot weakness and drop. The patient states that her pain has worsened since her last evaluation, noting that her heel pain continued to increase. She presents wearing an ASO brace to the right foot and a knee runner brace. She is wearing sneakers with a wedge insert to the outer side of her foot. The patient has been attending physical therapy for this issue. She has seen her Podiatrist who gave the patient a heel insert, however the patient reports discomfort with use. She is walking with a walker for ambulation assistance. No other complaints. \par \par 10/4/2021: ALMA ROSA CASH is a 61 year old female presenting for a follow-up evaluation of right foot weakness and drop. The patient’s pain is noted to be worsened since her last evaluation, noting that her heel pain has increased. She is concerned with instability with walking and weightbearing on the foot. The patient has been attending physical therapy for this issue, but has not attended in the past 3 weeks. The patient presents ambulating with a cane. She is wearing sneakers with an ASO brace. The patient is walking with a limp at the clinic.  		\par 	\par 7/26/21: The patieht is here for a follow-up of her right foot weakness and foot drop.  She states that the brace is currently being made by [employer].  Possible KAFO brace. \par \par 5/24/2021: ALMA ROSA CASH is a 60 year old female who presents for follow-up evaluation of right foot weakness.  The patient is in PT and works on gait training.  She is having trouble walking without assistance.  Her pain scale 0/10.  No other complaints.  Using brace and cane for ambulation.\par \par 2/24/2021: Alma Rosa is a 60-year-old female who suffers from neurological issues in her right lower extremity.  This causes weakness of the right ankle and right foot.  She uses a cane and walks with a high-stepping gait because of the weakness in her right ankle causing a foot drop presentation.  She has minimal pain in the ankle and foot.  She was referred by her neurologist to get a possible brace for her ankle.  She has no other issues.\par \par Patient is managed by a neurologist for chronic peripheral demyelinating neuropathy. She receives infusion treatments for this issue. She has been diagnosed with a right foot drop in the past.\par

## 2022-01-14 NOTE — PHYSICAL EXAM
[de-identified] : Right ankle Physical Examination:\par \par General: Alert and oriented x3.  In no acute distress.  Pleasant in nature with a normal affect.  No apparent respiratory distress. \par Erythema, Warmth, Rubor: Negative\par Swelling: Negative\par \par Pes cavus w/ varus hindfoot\par \par ROM:\par 1. Dorsiflexion: 0 degrees\par 2. Plantarflexion: 40 degrees\par 3. Inversion: 10 degrees\par 4. Eversion: 10 degrees\par \par *Severe ankle weakness present.  2 out of 5 strength, dorsi flexion.\par \par Tenderness to Palpation: \par 1. Lateral Malleolus: Negative\par 2. Medial Malleolus: Negative\par 3. Proximal Fibular Pain: Negative\par 4. Heel Pain: Negative\par 5. Cuboid: Negative\par 6. Navicular: Negative\par 7. Tibiotalar Joint: Negative\par 8. Subtalar Joint: Negative\par 9. Posterior Recess: Negative\par \par Tendon Pain:\par 1. Achilles: Negative\par 2. Peroneals: Negative\par 3. Posterior Tibialis: Negative\par 4. Tibialis Anterior: Negative\par \par Ligament Pain:\par 1. ATFL: Negative\par 2. CFL: Negative \par 3. PTFL: Negative\par 4. Deltoid Ligaments: Negative\par 5. Lis Franc Ligament: Negative\par \par Stability: \par 1. Anterior Drawer: Negative\par 2. Posterior Drawer: Negative\par \par Strength: 5/5 TA/GS/EHL\par \par Pulses: 2+ DP/PT Pulses\par \par Neuro: Intact motor and sensory but she does have decreased sensation in the right foot.\par \par Additional Test:\par 1. Calcaneal Squeeze Test: Negative\par 2. Syndesmosis Squeeze Test: Negative\par \par *The patient walks with a high step gait right foot.  Patient in recurvatum right knee.  [de-identified] : No new imaging was obtained. \par

## 2022-01-14 NOTE — ADDENDUM
[FreeTextEntry1] : I, Elizabeth Gandara, acted solely as a scribe for Dr. Franklin Thornton on this date 01/14/2022.\par \par All medical record entries made by the Scribe were at my, Dr. Franklin Thornton, direction and personally dictated by me on 01/14/2022 . I have reviewed the chart and agree that the record accurately reflects my personal performance of the history, physical exam, assessment and plan. I have also personally directed, reviewed, and agreed with the chart.	\par

## 2022-01-14 NOTE — DISCUSSION/SUMMARY
[de-identified] : Today I had a lengthy discussion with the patient regarding their right foot pain and weakness, right foot drop. I have addressed all the patient's concerns surrounding the pathology of their condition. We discussed both operative and non-operative treatment options in detail. A discussion was had about utilizing custom bracing interventions.The patient was educated about custom bracing in the office today. I recommend the patient undergo a course of physical therapy for the right foot 2-3 times a week for a total of 6-8 weeks. A prescription was given for the physical therapy today. The patient understood and verbally agreed to the treatment plan. All of their questions were answered and they were satisfied with the visit. The patient should call the office if they have any questions or experience worsening symptoms. I would like to see the patient back in the office in 6-8 weeks to reassess their condition. 				\par

## 2022-03-11 ENCOUNTER — APPOINTMENT (OUTPATIENT)
Dept: ORTHOPEDIC SURGERY | Facility: CLINIC | Age: 62
End: 2022-03-11
Payer: COMMERCIAL

## 2022-03-11 PROCEDURE — 99213 OFFICE O/P EST LOW 20 MIN: CPT

## 2022-03-11 NOTE — PHYSICAL EXAM
[de-identified] : Right ankle Physical Examination:\par \par General: Alert and oriented x3.  In no acute distress.  Pleasant in nature with a normal affect.  No apparent respiratory distress. \par Erythema, Warmth, Rubor: Negative\par Swelling: Negative\par \par Pes cavus w/ varus hindfoot\par \par ROM:\par 1. Dorsiflexion: 0 degrees\par 2. Plantarflexion: 40 degrees\par 3. Inversion: 10 degrees\par 4. Eversion: 10 degrees\par \par *Severe ankle weakness present.  2 out of 5 strength, dorsi flexion.\par \par Tenderness to Palpation: \par 1. Lateral Malleolus: Negative\par 2. Medial Malleolus: Negative\par 3. Proximal Fibular Pain: Negative\par 4. Heel Pain: Negative\par 5. Cuboid: Negative\par 6. Navicular: Negative\par 7. Tibiotalar Joint: Negative\par 8. Subtalar Joint: Negative\par 9. Posterior Recess: Negative\par \par Tendon Pain:\par 1. Achilles: Negative\par 2. Peroneals: Negative\par 3. Posterior Tibialis: Negative\par 4. Tibialis Anterior: Negative\par \par Ligament Pain:\par 1. ATFL: Negative\par 2. CFL: Negative \par 3. PTFL: Negative\par 4. Deltoid Ligaments: Negative\par 5. Lis Franc Ligament: Negative\par \par Stability: \par 1. Anterior Drawer: Negative\par 2. Posterior Drawer: Negative\par \par Strength: 5/5 TA/GS/EHL\par \par Pulses: 2+ DP/PT Pulses\par \par Neuro: Intact motor and sensory but she does have decreased sensation in the right foot.\par \par Additional Test:\par 1. Calcaneal Squeeze Test: Negative\par 2. Syndesmosis Squeeze Test: Negative\par \par *The patient walks with a high step gait right foot.  Patient in recurvatum right knee.  [de-identified] : No new imaging was obtained. \par

## 2022-03-11 NOTE — HISTORY OF PRESENT ILLNESS
[FreeTextEntry1] : 3/11/22: The patient is here for evaluation and follow-up for her right ankle/foot weakness and drop.  The patient is currently using an ASO brace and a knee runner brace to stabilize her knee and her ankle and it is helping her.  She is using a walker for assistance.  She has minimal to no pain today in the office.\par \par 1/14/2021: The patient is a 61 year old female with a MHx of Neuropathy presenting for a follow-up evaluation of right foot weakness and drop. The patient states that her pain has worsened since her last evaluation, noting that her heel pain continued to increase. She presents wearing an ASO brace to the right foot and a knee runner brace. She is wearing sneakers with a wedge insert to the outer side of her foot. The patient has been attending physical therapy for this issue. She has seen her Podiatrist who gave the patient a heel insert, however the patient reports discomfort with use. She is walking with a walker for ambulation assistance. No other complaints. \par \par 10/4/2021: ALMA ROSA CASH is a 61 year old female presenting for a follow-up evaluation of right foot weakness and drop. The patient’s pain is noted to be worsened since her last evaluation, noting that her heel pain has increased. She is concerned with instability with walking and weightbearing on the foot. The patient has been attending physical therapy for this issue, but has not attended in the past 3 weeks. The patient presents ambulating with a cane. She is wearing sneakers with an ASO brace. The patient is walking with a limp at the clinic.  		\par 	\par 7/26/21: The patieht is here for a follow-up of her right foot weakness and foot drop.  She states that the brace is currently being made by [employer].  Possible KAFO brace. \par \par 5/24/2021: ALMA ROSA CASH is a 60 year old female who presents for follow-up evaluation of right foot weakness.  The patient is in PT and works on gait training.  She is having trouble walking without assistance.  Her pain scale 0/10.  No other complaints.  Using brace and cane for ambulation.\par \par 2/24/2021: Alma Rosa is a 60-year-old female who suffers from neurological issues in her right lower extremity.  This causes weakness of the right ankle and right foot.  She uses a cane and walks with a high-stepping gait because of the weakness in her right ankle causing a foot drop presentation.  She has minimal pain in the ankle and foot.  She was referred by her neurologist to get a possible brace for her ankle.  She has no other issues.\par \par Patient is managed by a neurologist for chronic peripheral demyelinating neuropathy. She receives infusion treatments for this issue. She has been diagnosed with a right foot drop in the past.\par

## 2022-05-16 NOTE — H&P PST ADULT - NS PRO ABUSE SCREEN AFRAID ANYONE YN
Caller: LopezMagida RAMYA    Relationship: Self    Best call back number: [phone]    Requested Prescriptions:   Requested Prescriptions     Pending Prescriptions Disp Refills   • gabapentin (NEURONTIN) 300 MG capsule 180 capsule 0     Sig: Take 2 capsules by mouth 3 (Three) Times a Day.   • hydrOXYzine (ATARAX) 50 MG tablet       Sig: Take 1 tablet by mouth Every 8 (Eight) Hours As Needed.        Pharmacy where request should be sent: [facility] [address] AT [facility] [phone] [facility] [phone] FX       Does the patient have less than a 3 day supply:  [x] Yes  [] No    Judith Boswell, Agapito Rep   05/13/22 13:27 EDT         
Is hydroxyzine okay to refill? Please advise. Thank you.    Rx Refill Note  Requested Prescriptions     Pending Prescriptions Disp Refills   • gabapentin (NEURONTIN) 300 MG capsule 180 capsule 0     Sig: Take 2 capsules by mouth 3 (Three) Times a Day.   • hydrOXYzine (ATARAX) 50 MG tablet       Sig: Take 1 tablet by mouth Every 8 (Eight) Hours As Needed.      Last office visit with prescribing clinician: 4/28/2022      Next office visit with prescribing clinician: 6/8/2022            Helder Perry MA/LMR  05/16/22, 07:07 EDT  
no

## 2023-06-09 ENCOUNTER — APPOINTMENT (OUTPATIENT)
Dept: ORTHOPEDIC SURGERY | Facility: CLINIC | Age: 63
End: 2023-06-09
Payer: MEDICARE

## 2023-06-09 DIAGNOSIS — G57.91 UNSPECIFIED MONONEUROPATHY OF RIGHT LOWER LIMB: ICD-10-CM

## 2023-06-09 DIAGNOSIS — M25.561 PAIN IN RIGHT KNEE: ICD-10-CM

## 2023-06-09 DIAGNOSIS — M79.671 PAIN IN RIGHT FOOT: ICD-10-CM

## 2023-06-09 DIAGNOSIS — G89.29 PAIN IN RIGHT KNEE: ICD-10-CM

## 2023-06-09 DIAGNOSIS — R29.898 OTHER SYMPTOMS AND SIGNS INVOLVING THE MUSCULOSKELETAL SYSTEM: ICD-10-CM

## 2023-06-09 DIAGNOSIS — M21.371 FOOT DROP, RIGHT FOOT: ICD-10-CM

## 2023-06-09 DIAGNOSIS — M25.361 OTHER INSTABILITY, RIGHT KNEE: ICD-10-CM

## 2023-06-09 DIAGNOSIS — Q66.70 CONGEN PES CAVUS, UNSP FOOT: ICD-10-CM

## 2023-06-09 PROCEDURE — 99212 OFFICE O/P EST SF 10 MIN: CPT

## 2023-06-09 NOTE — DISCUSSION/SUMMARY
[de-identified] : Patient came into office today to discuss the the right lower extremity and custom bracing.  The [place] brace that she has currently as well as the functional knee brace is completely worn out and not functioning at this time/not fitting properly.  The patient is here today to meet with my orthotist Sammy Howard to discuss getting replacement [place] brace as well as a functional knee brace as well as additional options for her disability of the right lower extremity.  All of the patient's questions were discussed and answered.  Follow-up as needed.

## 2023-06-09 NOTE — HISTORY OF PRESENT ILLNESS
[FreeTextEntry1] : 06/09/23: ALMA ROSA CASH is a 62 year old female who presents for follow up evaluation of her right knee/right ankle and foot and to discuss getting replacement custom braces for her right knee, right ankle/foot being that her other custom braces are completely worn out.  Patient has other issues and has the same complaints as previous office visits.\par \par 3/11/22: The patient is here for evaluation and follow-up for her right ankle/foot weakness and drop.  The patient is currently using an ASO brace and a knee runner brace to stabilize her knee and her ankle and it is helping her.  She is using a walker for assistance.  She has minimal to no pain today in the office.\par \par 1/14/2021: The patient is a 61 year old female with a MHx of Neuropathy presenting for a follow-up evaluation of right foot weakness and drop. The patient states that her pain has worsened since her last evaluation, noting that her heel pain continued to increase. She presents wearing an ASO brace to the right foot and a knee runner brace. She is wearing sneakers with a wedge insert to the outer side of her foot. The patient has been attending physical therapy for this issue. She has seen her Podiatrist who gave the patient a heel insert, however the patient reports discomfort with use. She is walking with a walker for ambulation assistance. No other complaints. \par \par 10/4/2021: ALMA ROSA CASH is a 61 year old female presenting for a follow-up evaluation of right foot weakness and drop. The patient’s pain is noted to be worsened since her last evaluation, noting that her heel pain has increased. She is concerned with instability with walking and weightbearing on the foot. The patient has been attending physical therapy for this issue, but has not attended in the past 3 weeks. The patient presents ambulating with a cane. She is wearing sneakers with an ASO brace. The patient is walking with a limp at the clinic.  		\par 	\par 7/26/21: The patieht is here for a follow-up of her right foot weakness and foot drop.  She states that the brace is currently being made by Sammy perez.  Possible KAFO brace. \par \par 5/24/2021: ALMA ROSA CASH is a 60 year old female who presents for follow-up evaluation of right foot weakness.  The patient is in PT and works on gait training.  She is having trouble walking without assistance.  Her pain scale 0/10.  No other complaints.  Using brace and cane for ambulation.\par \par 2/24/2021: Alma Rosa is a 60-year-old female who suffers from neurological issues in her right lower extremity.  This causes weakness of the right ankle and right foot.  She uses a cane and walks with a high-stepping gait because of the weakness in her right ankle causing a foot drop presentation.  She has minimal pain in the ankle and foot.  She was referred by her neurologist to get a possible brace for her ankle.  She has no other issues.\par \par Patient is managed by a neurologist for chronic peripheral demyelinating neuropathy. She receives infusion treatments for this issue. She has been diagnosed with a right foot drop in the past.\par

## 2023-06-09 NOTE — PHYSICAL EXAM
[de-identified] : Right ankle Physical Examination:\par \par General: Alert and oriented x3.  In no acute distress.  Pleasant in nature with a normal affect.  No apparent respiratory distress. \par Erythema, Warmth, Rubor: Negative\par Swelling: Negative\par \par Pes cavus w/ varus hindfoot\par \par ROM:\par 1. Dorsiflexion: 0 degrees\par 2. Plantarflexion: 40 degrees\par 3. Inversion: 10 degrees\par 4. Eversion: 10 degrees\par \par *Severe ankle weakness present.  2 out of 5 strength, dorsi flexion.\par \par Tenderness to Palpation: \par 1. Lateral Malleolus: Negative\par 2. Medial Malleolus: Negative\par 3. Proximal Fibular Pain: Negative\par 4. Heel Pain: Negative\par 5. Cuboid: Negative\par 6. Navicular: Negative\par 7. Tibiotalar Joint: Negative\par 8. Subtalar Joint: Negative\par 9. Posterior Recess: Negative\par \par Tendon Pain:\par 1. Achilles: Negative\par 2. Peroneals: Negative\par 3. Posterior Tibialis: Negative\par 4. Tibialis Anterior: Negative\par \par Ligament Pain:\par 1. ATFL: Negative\par 2. CFL: Negative \par 3. PTFL: Negative\par 4. Deltoid Ligaments: Negative\par 5. Lis Franc Ligament: Negative\par \par Stability: \par 1. Anterior Drawer: Negative\par 2. Posterior Drawer: Negative\par \par Strength: 3/5 TA/GS/EHL.  \par \par Pulses: 2+ DP/PT Pulses\par \par Neuro: Intact motor and sensory but she does have decreased sensation in the right foot.\par \par Additional Test:\par 1. Calcaneal Squeeze Test: Negative\par 2. Syndesmosis Squeeze Test: Negative\par \par *Patient has ankle instability and goes into varus deformity with a cavus foot deformity.\par \par \par Right knee Physical Examination:\par \par General: Alert and oriented x3.  In no acute distress.  Pleasant in nature with a normal affect.  No apparent respiratory distress. \par \par Erythema, Warmth, Rubor: Negative\par Swelling/Edema: Negative\par ROM: -20 ext to 130 degrees\par Leanna's Test: Negative \par Medial Joint Line TTP: Negative\par Lateral Joint Line TTP: Negative \par Lachman Exam/Anterior Drawer/Pivot Shift Test: Negative \par MCL Pain: Negative\par LCL Pain: Negative\par Iliotibial Band Pain: Negative\par Patellofemoral Joint Pain: Negative\par Patellar Tendon TTP: Negative\par Pes Anserinus TTP: Negative\par Homans Sign: Negative\par Posterior Knee Pain: Negative\par Neuro: Intact with no sensory or motor deficits\par 3/5 MMT Quads/Hamstrings.\par Laxity with valgus stress to the right knee.\par \par *The patient walks with a high step gait right foot.  Patient in recurvatum right knee.  [de-identified] : No new imaging was obtained. \par

## 2024-11-26 ENCOUNTER — APPOINTMENT (RX ONLY)
Dept: URBAN - METROPOLITAN AREA CLINIC 144 | Facility: CLINIC | Age: 64
Setting detail: DERMATOLOGY
End: 2024-11-26

## 2024-11-26 DIAGNOSIS — L81.4 OTHER MELANIN HYPERPIGMENTATION: ICD-10-CM

## 2024-11-26 DIAGNOSIS — D22 MELANOCYTIC NEVI: ICD-10-CM

## 2024-11-26 DIAGNOSIS — L82.1 OTHER SEBORRHEIC KERATOSIS: ICD-10-CM

## 2024-11-26 DIAGNOSIS — F42.4 EXCORIATION (SKIN-PICKING) DISORDER: ICD-10-CM

## 2024-11-26 DIAGNOSIS — D18.0 HEMANGIOMA: ICD-10-CM

## 2024-11-26 DIAGNOSIS — L85.3 XEROSIS CUTIS: ICD-10-CM | Status: INADEQUATELY CONTROLLED

## 2024-11-26 DIAGNOSIS — Z71.89 OTHER SPECIFIED COUNSELING: ICD-10-CM

## 2024-11-26 PROBLEM — D22.61 MELANOCYTIC NEVI OF RIGHT UPPER LIMB, INCLUDING SHOULDER: Status: ACTIVE | Noted: 2024-11-26

## 2024-11-26 PROBLEM — D22.5 MELANOCYTIC NEVI OF TRUNK: Status: ACTIVE | Noted: 2024-11-26

## 2024-11-26 PROBLEM — D18.01 HEMANGIOMA OF SKIN AND SUBCUTANEOUS TISSUE: Status: ACTIVE | Noted: 2024-11-26

## 2024-11-26 PROCEDURE — ? COUNSELING

## 2024-11-26 PROCEDURE — 99204 OFFICE O/P NEW MOD 45 MIN: CPT

## 2024-11-26 PROCEDURE — ? SUNSCREEN RECOMMENDATIONS

## 2024-11-26 PROCEDURE — ? PRESCRIPTION MEDICATION MANAGEMENT

## 2024-11-26 PROCEDURE — ? PRESCRIPTION

## 2024-11-26 RX ORDER — MUPIROCIN 20 MG/G
OINTMENT TOPICAL
Qty: 22 | Refills: 1 | Status: ERX | COMMUNITY
Start: 2024-11-26

## 2024-11-26 RX ADMIN — MUPIROCIN: 20 OINTMENT TOPICAL at 00:00

## 2024-11-26 ASSESSMENT — LOCATION DETAILED DESCRIPTION DERM
LOCATION DETAILED: RIGHT PROXIMAL DORSAL FOREARM
LOCATION DETAILED: RIGHT MEDIAL SUPERIOR CHEST
LOCATION DETAILED: RIGHT NARIS
LOCATION DETAILED: RIGHT SUPERIOR MEDIAL UPPER BACK
LOCATION DETAILED: LEFT ANTERIOR PROXIMAL THIGH
LOCATION DETAILED: RIGHT ANTERIOR SHOULDER
LOCATION DETAILED: LEFT NARIS
LOCATION DETAILED: RIGHT LATERAL SUPERIOR CHEST
LOCATION DETAILED: LEFT INFERIOR ANTERIOR NECK
LOCATION DETAILED: LEFT PROXIMAL DORSAL FOREARM
LOCATION DETAILED: RIGHT DISTAL POSTERIOR UPPER ARM
LOCATION DETAILED: RIGHT PROXIMAL POSTERIOR UPPER ARM
LOCATION DETAILED: SUPERIOR THORACIC SPINE
LOCATION DETAILED: EPIGASTRIC SKIN
LOCATION DETAILED: RIGHT RIB CAGE
LOCATION DETAILED: RIGHT MID-UPPER BACK
LOCATION DETAILED: RIGHT SUPERIOR UPPER BACK
LOCATION DETAILED: LEFT PROXIMAL POSTERIOR UPPER ARM
LOCATION DETAILED: STERNAL NOTCH

## 2024-11-26 ASSESSMENT — LOCATION SIMPLE DESCRIPTION DERM
LOCATION SIMPLE: LEFT FOREARM
LOCATION SIMPLE: RIGHT FOREARM
LOCATION SIMPLE: LEFT NOSE
LOCATION SIMPLE: ABDOMEN
LOCATION SIMPLE: RIGHT POSTERIOR UPPER ARM
LOCATION SIMPLE: RIGHT SHOULDER
LOCATION SIMPLE: LEFT POSTERIOR UPPER ARM
LOCATION SIMPLE: LEFT THIGH
LOCATION SIMPLE: LEFT ANTERIOR NECK
LOCATION SIMPLE: RIGHT UPPER BACK
LOCATION SIMPLE: RIGHT NOSE
LOCATION SIMPLE: UPPER BACK
LOCATION SIMPLE: CHEST

## 2024-11-26 ASSESSMENT — LOCATION ZONE DERM
LOCATION ZONE: TRUNK
LOCATION ZONE: NECK
LOCATION ZONE: ARM
LOCATION ZONE: NOSE
LOCATION ZONE: LEG

## 2024-11-26 NOTE — PROCEDURE: COUNSELING
Detail Level: Zone
Sunscreen Recommendations: I recommend Sunscreen with Zinc, at least SPF#30 and reapply every 2 hours with any sun exposure.
Moisturizer Recommendations: Eucerin or Cerave lotion daily to semi wet skin
Detail Level: Detailed
Cleanser Recommendations: Dove bar soap